# Patient Record
Sex: MALE | Race: ASIAN | NOT HISPANIC OR LATINO | Employment: FULL TIME | URBAN - METROPOLITAN AREA
[De-identification: names, ages, dates, MRNs, and addresses within clinical notes are randomized per-mention and may not be internally consistent; named-entity substitution may affect disease eponyms.]

---

## 2017-03-26 ENCOUNTER — OFFICE VISIT (OUTPATIENT)
Dept: URGENT CARE | Facility: URGENT CARE | Age: 27
End: 2017-03-26
Payer: COMMERCIAL

## 2017-03-26 VITALS
DIASTOLIC BLOOD PRESSURE: 80 MMHG | HEIGHT: 66 IN | TEMPERATURE: 98.4 F | HEART RATE: 82 BPM | WEIGHT: 136 LBS | SYSTOLIC BLOOD PRESSURE: 140 MMHG | OXYGEN SATURATION: 97 % | BODY MASS INDEX: 21.86 KG/M2

## 2017-03-26 DIAGNOSIS — J06.9 VIRAL URI WITH COUGH: Primary | ICD-10-CM

## 2017-03-26 PROCEDURE — 99213 OFFICE O/P EST LOW 20 MIN: CPT | Performed by: FAMILY MEDICINE

## 2017-03-26 RX ORDER — BENZONATATE 200 MG/1
200 CAPSULE ORAL 3 TIMES DAILY PRN
Qty: 30 CAPSULE | Refills: 0 | Status: SHIPPED | OUTPATIENT
Start: 2017-03-26 | End: 2024-03-10

## 2017-03-26 NOTE — PATIENT INSTRUCTIONS

## 2017-03-26 NOTE — MR AVS SNAPSHOT
After Visit Summary   3/26/2017    Kevin Rivas    MRN: [id number]           Patient Information     Date Of Birth          1990        Visit Information        Provider Department      3/26/2017 10:15 AM Jemima Parham MD [facility]        Today's Diagnoses     Viral URI with cough    -  1      Care Instructions      Viral Upper Respiratory Illness (Adult)  You have a viral upper respiratory illness (URI), which is another term for the common cold. This illness is contagious during the first few days. It is spread through the air by coughing and sneezing. It may also be spread by direct contact (touching the sick person and then touching your own eyes, nose, or mouth). Frequent handwashing will decrease risk of spread. Most viral illnesses go away within 7 to 10 days with rest and simple home remedies. Sometimes the illness may last for several weeks. Antibiotics will not kill a virus, and they are generally not prescribed for this condition.    Home care    If symptoms are severe, rest at home for the first 2 to 3 days. When you resume activity, don't let yourself get too tired.    Avoid being exposed to cigarette smoke (yours or others ).    You may use acetaminophen or ibuprofen to control pain and fever, unless another medicine was prescribed. (Note: If you have chronic liver or kidney disease, have ever had a stomach ulcer or gastrointestinal bleeding, or are taking blood-thinning medicines, talk with your healthcare provider before using these medicines.) Aspirin should never be given to anyone under 18 years of age who is ill with a viral infection or fever. It may cause severe liver or brain damage.    Your appetite may be poor, so a light diet is fine. Avoid dehydration by drinking 6 to 8 glasses of fluids per day (water, soft drinks, juices, tea, or soup). Extra fluids will help loosen secretions in the nose and lungs.    Over-the-counter cold  medicines will not shorten the length of time you re sick, but they may be helpful for the following symptoms: cough, sore throat, and nasal and sinus congestion. (Note: Do not use decongestants if you have high blood pressure.)  Follow-up care  Follow up with your healthcare provider, or as advised.  When to seek medical advice  Call your healthcare provider right away if any of these occur:    Cough with lots of colored sputum (mucus)    Severe headache; face, neck, or ear pain    Difficulty swallowing due to throat pain    Fever of 100.4 F (38 C)  Call 911, or get immediate medical care  Call emergency services right away if any of these occur:    Chest pain, shortness of breath, wheezing, or difficulty breathing    Coughing up blood    Inability to swallow due to throat pain    2335-9048 [employer]. [address] [place] [address], [place] [address]. All rights reserved. This information is not intended as a substitute for professional medical care. Always follow your healthcare professional's instructions.              Follow-ups after your visit        Follow-up notes from your care team     Return if symptoms worsen or fail to improve.      Who to contact     If you have questions or need follow up information about today's clinic visit or your schedule please contact [facility] directly at [phone].  Normal or non-critical lab and imaging results will be communicated to you by [employer], letter or phone within 4 business days after the clinic has received the results. If you do not hear from us within 7 days, please contact the clinic through [employer] or phone. If you have a critical or abnormal lab result, we will notify you by phone as soon as possible.  Submit refill requests through [employer] or call your pharmacy and they will forward the refill request to us. Please allow 3 business days for your refill to be completed.          Additional Information About Your Visit       "  MyChart Information     [employer] gives you secure access to your electronic health record. If you see a primary care provider, you can also send messages to your care team and make appointments. If you have questions, please call your primary care clinic.  If you do not have a primary care provider, please call [phone] and they will assist you.        Care EveryWhere ID     This is your Care EveryWhere ID. This could be used by other organizations to access your [facility] medical records  [id number]        Your Vitals Were     Pulse Temperature Height Pulse Oximetry BMI (Body Mass Index)       82 98.4  F (36.9  C) (Tympanic) 5' 6\" (1.676 m) 97% 21.95 kg/m2        Blood Pressure from Last 3 Encounters:   03/26/17 140/80   06/30/16 112/68   04/22/11 132/70    Weight from Last 3 Encounters:   03/26/17 136 lb (61.7 kg)   06/30/16 138 lb 8 oz (62.8 kg)   07/23/15 143 lb 12.8 oz (65.2 kg)              Today, you had the following     No orders found for display         Today's Medication Changes          These changes are accurate as of: 3/26/17 10:34 AM.  If you have any questions, ask your nurse or doctor.               Start taking these medicines.        Dose/Directions    benzonatate 200 MG capsule   Commonly known as:  TESSALON   Used for:  Viral URI with cough   Started by:  Jemima Parham MD        Dose:  200 mg   Take 1 capsule (200 mg) by mouth 3 times daily as needed for cough   Quantity:  30 capsule   Refills:  0            Where to get your medicines      These medications were sent to [id number] - [place], [place] - [address] AT [facility] [address]  [address], [place] [address]     Phone:  [phone]     benzonatate 200 MG capsule                Primary Care Provider    None Specified       No primary provider on file.        Thank you!     Thank you for choosing [facility]  for your care. Our goal is always to provide you with " excellent care. Hearing back from our patients is one way we can continue to improve our services. Please take a few minutes to complete the written survey that you may receive in the mail after your visit with us. Thank you!             Your Updated Medication List - Protect others around you: Learn how to safely use, store and throw away your medicines at www.disposemymeds.org.          This list is accurate as of: 3/26/17 10:34 AM.  Always use your most recent med list.                   Brand Name Dispense Instructions for use    benzonatate 200 MG capsule    TESSALON    30 capsule    Take 1 capsule (200 mg) by mouth 3 times daily as needed for cough       CENTRUM PO      Reported on 3/26/2017       * order for DME     1 Device    Cam boot Left lower extremity.       * order for DME     1 Device    Crutches       TYLENOL PO          VITAMIN D3 PO      Take 1,000 Units by mouth Reported on 3/26/2017       * Notice:  This list has 2 medication(s) that are the same as other medications prescribed for you. Read the directions carefully, and ask your doctor or other care provider to review them with you.

## 2017-03-26 NOTE — PROGRESS NOTES
SUBJECTIVE:   Kevin Rivas is a 27 year old male who complains of nasal congestion, dry cough and tactile fevers for 2-3 days. He denies a history of no other unusual symptoms. He denies a history of asthma. Patient does smoke cigarettes.     OBJECTIVE:  Vitals as noted by Nurse/MA above.  Appearance: in no apparent distress.   ENT- ENT exam normal except mild nasal mucosal irritation, no neck nodes or sinus tenderness.   Chest - chest clear to IPPA, no tachypnea, retractions or cyanosis and S1, S2 normal, no murmur, no gallop, rate regular.    ASSESSMENT:   Cough and Viral upper respiratory illness    PLAN:  Symptomatic therapy suggested: push fluids, rest, use vaporizer or mist needed  and use acetaminophen, cough suppressant of choice as needed. Call or return to clinic prn if these symptoms worsen or fail to improve as anticipated.  Note for work given.   Jemima Gonzales MD

## 2017-03-26 NOTE — LETTER
[facility]   [address] [place] [address]  [id number]  [phone]      March 26, 2017      To Whom It May Concern,    Kevin Rivas was seen today at [facility].  He is not able to attend work tomorrow, March 27, 2017, secondary to a medical illness.  Thanks.     Sincerely,      Jemima Gonzales MD

## 2020-03-01 ENCOUNTER — HEALTH MAINTENANCE LETTER (OUTPATIENT)
Age: 30
End: 2020-03-01

## 2020-12-14 ENCOUNTER — HEALTH MAINTENANCE LETTER (OUTPATIENT)
Age: 30
End: 2020-12-14

## 2021-04-03 ENCOUNTER — OFFICE VISIT (OUTPATIENT)
Dept: URGENT CARE | Facility: URGENT CARE | Age: 31
End: 2021-04-03

## 2021-04-03 VITALS
OXYGEN SATURATION: 100 % | TEMPERATURE: 98.8 F | WEIGHT: 140 LBS | HEART RATE: 86 BPM | HEIGHT: 66 IN | SYSTOLIC BLOOD PRESSURE: 132 MMHG | DIASTOLIC BLOOD PRESSURE: 82 MMHG | BODY MASS INDEX: 22.5 KG/M2

## 2021-04-03 DIAGNOSIS — R22.0 SWELLING OF RIGHT SIDE OF FACE: Primary | ICD-10-CM

## 2021-04-03 LAB — WBC # BLD AUTO: 9.7 10E9/L (ref 4–11)

## 2021-04-03 PROCEDURE — 85048 AUTOMATED LEUKOCYTE COUNT: CPT | Performed by: PHYSICIAN ASSISTANT

## 2021-04-03 PROCEDURE — 99203 OFFICE O/P NEW LOW 30 MIN: CPT | Performed by: PHYSICIAN ASSISTANT

## 2021-04-03 PROCEDURE — 36415 COLL VENOUS BLD VENIPUNCTURE: CPT | Performed by: PHYSICIAN ASSISTANT

## 2021-04-03 RX ORDER — OMEGA-3 FATTY ACIDS/FISH OIL 300-1000MG
200 CAPSULE ORAL EVERY 4 HOURS PRN
COMMUNITY
End: 2024-06-20

## 2021-04-03 ASSESSMENT — MIFFLIN-ST. JEOR: SCORE: 1522.79

## 2021-04-03 NOTE — PATIENT INSTRUCTIONS
Follow up right away with new or worsening symptoms     Follow up if symptoms are not improving in 48 hours    Follow up if symptoms are not resolved at completion of antibiotics    Take probiotic 1 hour after each antibiotic dose    Patient Education     Facial Cellulitis  Cellulitis is an infection of the deep layers of skin. A break in the skin, such as a cut or scratch, can let bacteria under the skin. It may also occur from an infected oil gland (pimple) or hair follicle. If the bacteria get to deep layers of the skin, it can be serious. If not treated, cellulitis can get into the bloodstream and lymph nodes. The infection can then spread throughout the body. This causes serious illness. Cellulitis on the face is especially dangerous if it affects the skin around the eyes.   Cellulitis causes the affected skin to become red, swollen, warm, and sore. The reddened areas have a visible border. You may have a fever, chills, and pain.   Cellulitis is treated with antibiotics taken for 7 to 10 days. Symptoms should get better 1 to 2 days after treatment is started. Make sure to take all the antibiotics for the full number of days until they are gone. Keep taking the medicine even if your symptoms go away.   Home care  Follow these tips:    Take all of the antibiotic medicine exactly as directed until it's gone. Don t miss any doses, especially during the first 7 days. Don t stop taking it when your symptoms get better.    Use a cool compress (face cloth soaked in cool water) on your face to help reduce swelling and pain.    You may use acetaminophen or ibuprofen to reduce pain. Don t use these if you have chronic liver or kidney disease, or ever had a stomach ulcer or gastrointestinal bleeding. Talk with your healthcare provider first.  Follow-up care  Follow up with your healthcare provider, or as advised. If your infection doesn't go away on the first antibiotic, your healthcare provider will prescribe a different  one.   When to seek medical advice  Call your healthcare provider right away if any of these occur:    Fever higher of 100.4  F (38.0  C) or higher after 2 days on antibiotics    Red areas that spread    Swelling or pain that gets worse    Fluid leaking from the skin (pus)    An eyelid that swells shut or leaks fluid (pus)    Headache or neck pain that gets worse    Unusual drowsiness or confusion    Seizure    Change in eyesight  [employer] last reviewed this educational content on 8/1/2019 2000-2020 [employer]. All rights reserved. This information is not intended as a substitute for professional medical care. Always follow your healthcare professional's instructions.

## 2021-04-03 NOTE — PROGRESS NOTES
"SUBJECTIVE:  Kevin Rivas is a 31 year old male who presents to the clinic today for a rash.  Onset of rash was 1 day(s) ago.   Rash is sudden onset.  Location of the rash: face.  Quality/symptoms of rash: painful, red and swollen   Symptoms are moderate and rash seems to be worsening.  Previous history of a similar rash? No  Recent exposure history: none known    Associated symptoms include: nothing.    Past Medical History:   Diagnosis Date     Fracture of elbow      Hepatitis     ? in [facility]     Current Outpatient Medications   Medication Sig Dispense Refill     amoxicillin-clavulanate (AUGMENTIN) 875-125 MG tablet Take 1 tablet by mouth 2 times daily for 7 days 14 tablet 0     Cholecalciferol (VITAMIN D3 PO) Take 1,000 Units by mouth Reported on 3/26/2017       ibuprofen (ADVIL/MOTRIN) 200 MG capsule Take 200 mg by mouth every 4 hours as needed for fever       Acetaminophen (TYLENOL PO)        benzonatate (TESSALON) 200 MG capsule Take 1 capsule (200 mg) by mouth 3 times daily as needed for cough (Patient not taking: Reported on 4/3/2021) 30 capsule 0     Multiple Vitamins-Minerals (CENTRUM PO) Reported on 3/26/2017       order for DME Cam boot Left lower extremity. (Patient not taking: Reported on 3/26/2017) 1 Device 0     order for DME Crutches (Patient not taking: Reported on 3/26/2017) 1 Device 0     Social History     Tobacco Use     Smoking status: Current Every Day Smoker     Smokeless tobacco: Never Used   Substance Use Topics     Alcohol use: No     Alcohol/week: 0.0 standard drinks       ROS:  10 point ROS negative except as listed above      EXAM:   /82   Pulse 86   Temp 98.8  F (37.1  C) (Oral)   Ht 1.676 m (5' 6\")   Wt 63.5 kg (140 lb)   SpO2 100%   BMI 22.60 kg/m    SKIN: Rash description:    Distribution: right upper lip with swelling warmth and tenderness  GENERAL APPEARANCE: healthy, alert and no distress  EYES:  conjunctiva clear  NECK: supple, non-tender to palpation, no " adenopathy noted  RESP: lungs clear to auscultation - no rales, rhonchi or wheezes  CV: regular rates and rhythm, normal S1 S2, no murmur noted      Results for orders placed or performed in visit on 04/03/21   WBC count     Status: None   Result Value Ref Range    WBC 9.7 4.0 - 11.0 10e9/L       ASSESSMENT:  (R22.0) Swelling of right side of face  (primary encounter diagnosis)  Plan: WBC count, amoxicillin-clavulanate (AUGMENTIN)         875-125 MG tablet      Patient Instructions   Follow up right away with new or worsening symptoms     Follow up if symptoms are not improving in 48 hours    Follow up if symptoms are not resolved at completion of antibiotics    Take probiotic 1 hour after each antibiotic dose    Patient Education     Facial Cellulitis  Cellulitis is an infection of the deep layers of skin. A break in the skin, such as a cut or scratch, can let bacteria under the skin. It may also occur from an infected oil gland (pimple) or hair follicle. If the bacteria get to deep layers of the skin, it can be serious. If not treated, cellulitis can get into the bloodstream and lymph nodes. The infection can then spread throughout the body. This causes serious illness. Cellulitis on the face is especially dangerous if it affects the skin around the eyes.   Cellulitis causes the affected skin to become red, swollen, warm, and sore. The reddened areas have a visible border. You may have a fever, chills, and pain.   Cellulitis is treated with antibiotics taken for 7 to 10 days. Symptoms should get better 1 to 2 days after treatment is started. Make sure to take all the antibiotics for the full number of days until they are gone. Keep taking the medicine even if your symptoms go away.   Home care  Follow these tips:    Take all of the antibiotic medicine exactly as directed until it's gone. Don t miss any doses, especially during the first 7 days. Don t stop taking it when your symptoms get better.    Use a cool  compress (face cloth soaked in cool water) on your face to help reduce swelling and pain.    You may use acetaminophen or ibuprofen to reduce pain. Don t use these if you have chronic liver or kidney disease, or ever had a stomach ulcer or gastrointestinal bleeding. Talk with your healthcare provider first.  Follow-up care  Follow up with your healthcare provider, or as advised. If your infection doesn't go away on the first antibiotic, your healthcare provider will prescribe a different one.   When to seek medical advice  Call your healthcare provider right away if any of these occur:    Fever higher of 100.4  F (38.0  C) or higher after 2 days on antibiotics    Red areas that spread    Swelling or pain that gets worse    Fluid leaking from the skin (pus)    An eyelid that swells shut or leaks fluid (pus)    Headache or neck pain that gets worse    Unusual drowsiness or confusion    Seizure    Change in eyesight  [employer] last reviewed this educational content on 8/1/2019 2000-2020 [employer]. All rights reserved. This information is not intended as a substitute for professional medical care. Always follow your healthcare professional's instructions.

## 2021-04-03 NOTE — LETTER
[facility] [place] [address] [place] [address]  Phone: [phone]    April 3, 2021        Kevin Rivas  [address]  [facility] [address]          To whom it may concern:    RE: Kevin Rivas    Patient was seen and treated today at our clinic.  Please excuse absence on 4/5/21.    Please contact me for questions or concerns.      Sincerely,        Kalyan Spear PA-C

## 2021-04-17 ENCOUNTER — HEALTH MAINTENANCE LETTER (OUTPATIENT)
Age: 31
End: 2021-04-17

## 2021-10-02 ENCOUNTER — HEALTH MAINTENANCE LETTER (OUTPATIENT)
Age: 31
End: 2021-10-02

## 2022-05-14 ENCOUNTER — HEALTH MAINTENANCE LETTER (OUTPATIENT)
Age: 32
End: 2022-05-14

## 2022-07-14 ENCOUNTER — HOSPITAL ENCOUNTER (EMERGENCY)
Facility: CLINIC | Age: 32
Discharge: HOME | End: 2022-07-15
Attending: EMERGENCY MEDICINE | Admitting: EMERGENCY MEDICINE

## 2022-07-14 DIAGNOSIS — F19.10 SUBSTANCE ABUSE (H): ICD-10-CM

## 2022-07-14 LAB
ANION GAP SERPL CALCULATED.3IONS-SCNC: 5 MMOL/L (ref 3–14)
BASOPHILS # BLD AUTO: 0.1 10E3/UL (ref 0–0.2)
BASOPHILS NFR BLD AUTO: 1 %
BUN SERPL-MCNC: 18 MG/DL (ref 7–30)
CALCIUM SERPL-MCNC: 8.7 MG/DL (ref 8.5–10.1)
CHLORIDE BLD-SCNC: 107 MMOL/L (ref 94–109)
CO2 SERPL-SCNC: 24 MMOL/L (ref 20–32)
CREAT SERPL-MCNC: 0.88 MG/DL (ref 0.66–1.25)
EOSINOPHIL # BLD AUTO: 0 10E3/UL (ref 0–0.7)
EOSINOPHIL NFR BLD AUTO: 0 %
ERYTHROCYTE [DISTWIDTH] IN BLOOD BY AUTOMATED COUNT: 12.3 % (ref 10–15)
GFR SERPL CREATININE-BSD FRML MDRD: >90 ML/MIN/1.73M2
GLUCOSE BLD-MCNC: 101 MG/DL (ref 70–99)
HCT VFR BLD AUTO: 39.5 % (ref 40–53)
HGB BLD-MCNC: 13.8 G/DL (ref 13.3–17.7)
IMM GRANULOCYTES # BLD: 0 10E3/UL
IMM GRANULOCYTES NFR BLD: 0 %
LYMPHOCYTES # BLD AUTO: 1.2 10E3/UL (ref 0.8–5.3)
LYMPHOCYTES NFR BLD AUTO: 13 %
MCH RBC QN AUTO: 28.4 PG (ref 26.5–33)
MCHC RBC AUTO-ENTMCNC: 34.9 G/DL (ref 31.5–36.5)
MCV RBC AUTO: 81 FL (ref 78–100)
MONOCYTES # BLD AUTO: 0.5 10E3/UL (ref 0–1.3)
MONOCYTES NFR BLD AUTO: 6 %
NEUTROPHILS # BLD AUTO: 7.3 10E3/UL (ref 1.6–8.3)
NEUTROPHILS NFR BLD AUTO: 80 %
NRBC # BLD AUTO: 0 10E3/UL
NRBC BLD AUTO-RTO: 0 /100
PLATELET # BLD AUTO: 319 10E3/UL (ref 150–450)
POTASSIUM BLD-SCNC: 3.1 MMOL/L (ref 3.4–5.3)
RBC # BLD AUTO: 4.86 10E6/UL (ref 4.4–5.9)
SARS-COV-2 RNA RESP QL NAA+PROBE: NEGATIVE
SODIUM SERPL-SCNC: 136 MMOL/L (ref 133–144)
WBC # BLD AUTO: 9.1 10E3/UL (ref 4–11)

## 2022-07-14 PROCEDURE — 85025 COMPLETE CBC W/AUTO DIFF WBC: CPT | Performed by: EMERGENCY MEDICINE

## 2022-07-14 PROCEDURE — 258N000003 HC RX IP 258 OP 636: Performed by: EMERGENCY MEDICINE

## 2022-07-14 PROCEDURE — 250N000013 HC RX MED GY IP 250 OP 250 PS 637: Performed by: EMERGENCY MEDICINE

## 2022-07-14 PROCEDURE — 99285 EMERGENCY DEPT VISIT HI MDM: CPT | Mod: CS,25

## 2022-07-14 PROCEDURE — C9803 HOPD COVID-19 SPEC COLLECT: HCPCS

## 2022-07-14 PROCEDURE — U0005 INFEC AGEN DETEC AMPLI PROBE: HCPCS | Performed by: EMERGENCY MEDICINE

## 2022-07-14 PROCEDURE — 96361 HYDRATE IV INFUSION ADD-ON: CPT

## 2022-07-14 PROCEDURE — 80048 BASIC METABOLIC PNL TOTAL CA: CPT | Performed by: EMERGENCY MEDICINE

## 2022-07-14 PROCEDURE — 96360 HYDRATION IV INFUSION INIT: CPT

## 2022-07-14 PROCEDURE — 90791 PSYCH DIAGNOSTIC EVALUATION: CPT

## 2022-07-14 PROCEDURE — 99283 EMERGENCY DEPT VISIT LOW MDM: CPT | Mod: CS

## 2022-07-14 PROCEDURE — 36415 COLL VENOUS BLD VENIPUNCTURE: CPT | Performed by: EMERGENCY MEDICINE

## 2022-07-14 RX ORDER — POTASSIUM CHLORIDE 1.5 G/1.58G
40 POWDER, FOR SOLUTION ORAL ONCE
Status: COMPLETED | OUTPATIENT
Start: 2022-07-14 | End: 2022-07-14

## 2022-07-14 RX ADMIN — POTASSIUM CHLORIDE 40 MEQ: 1.5 POWDER, FOR SOLUTION ORAL at 23:35

## 2022-07-14 RX ADMIN — SODIUM CHLORIDE 1000 ML: 9 INJECTION, SOLUTION INTRAVENOUS at 12:27

## 2022-07-14 NOTE — ED NOTES
Bed: ED16  Expected date:   Expected time:   Means of arrival:   Comments:  [facility] - 515 - 32 M psych eval eta 1126

## 2022-07-14 NOTE — DISCHARGE INSTRUCTIONS
"Aftercare Plan  If I am feeling unsafe or I am in a crisis, I will:   Contact my established care providers   Call the National Suicide Prevention Lifeline: 152.674.5304   Go to the nearest emergency room   Call 911     Warning signs that I or other people might notice when a crisis is developing for me: \"I start to get nervious\"    Things I am able to do on my own to cope or help me feel better: Watch TV    Things that I am able to do with others to cope or help me better: Call my sister    Things I can use or do for distraction: watch movies  Changes I can make to support my mental health and wellness:  Go treatment    People in my life that I can ask for help: my sister    Your [facility] has a mental health crisis team you can call 24/7: [facility]  [phone]    Other things that are important when I'm in crisis: Patient denied knowing any other important things.    Additional resources and information: Patient is in need of calling [facility] to schedule an appointment for a psychiatrist at [phone] and a Substance Use Disorder assessment at [phone].      Crisis Lines  Crisis Text Line  Text 723335  You will be connected with a trained live crisis counselor to provide support.    Por espanol, texto  CHIQUIS a 249165 o texto a 442-AYUDAME en WhatsApp    [employer] (LGBTQ Youth Crisis Line)  5.642.699.9144  text START to 859-410      Community Resources  Fast Tracker  Linking people to mental health and substance use disorder resources  fasttrackermn.org     [place] Mental Health Warm Line  Peer to peer support  Monday thru Saturday, 12 pm to 10 pm  176.869.0235 or 1.475.431.6473  Text \"Support\" to 19005    [employer] [place] on Mental Illness (KEYANNA)  801.404.3746 or 1.888.KEYANNA.HELPS      Mental Health Apps  My3  https://my3app.org/    VirtualHopeBox  https://[employer].org/apps/virtual-hope-box/    [facility] will follow up with patient to schedule patient " for a therapy appointment and to sign patient up for insurance.

## 2022-07-14 NOTE — ED PROVIDER NOTES
History   Chief Complaint:  Drug Problem and Paranoid       The history is provided by a relative.      Kevin Rivas is a 32 year old male with history of methamphetamine abuse who presents with paranoia. The patient has been on a three day methamphetamine jackman. Today the patient began calling 911 multiple times as he was paranoid that he had hurt his girlfriend while he was using. When PD arrived, they confirmed this was not true. The patient was quite agitated and EMS administered 5 mg IM versed and 5 mg IM Haldol. While in the ED, the patient is heavily sedated and thus HPI and ROS are limited. Per the patient's sister, he had a similar episode of methamphetamine induced paranoia two years ago but he was not seen by a provider. She also notes that for the past few months, he has been having ongoing issues with paranoia. He does not like to go to the doctor's often and has no physical or mental health diagnosis. The patient recently lost his job in May and has been using methamphetamines increasingly often. He lives with a friend. She does not believe the patient has had any COVID symptoms recently including fever, sore throat, or cough.       Review of Systems   Unable to perform ROS: Acuity of condition       Allergies:  The patient has no known allergies.     Medications:  The patient is currently on no regular medications.    Past Medical History:     The patient has no pertinent medical history.    Past Surgical History:    The patient has no pertinent surgical history.     Family History:    Mother: hypertension    Social History:  The patient presents to the ED with his sister via EMS  Living Situation: Lives in [place] with a friend  Drug Use: Methamphetamine and alcohol  Occupation: Currently unemployed.       Physical Exam     Patient Vitals for the past 24 hrs:   BP Temp Temp src Pulse Resp SpO2   07/14/22 1515 108/77 -- -- 68 14 100 %   07/14/22 1500 112/76 -- -- 72 (!) 35 100 %   07/14/22 1450  103/74 -- -- 70 12 100 %   07/14/22 1430 110/67 -- -- 93 22 100 %   07/14/22 1420 107/71 -- -- 62 13 100 %   07/14/22 1400 91/57 -- -- 92 26 100 %   07/14/22 1215 92/62 -- -- 84 16 97 %   07/14/22 1145 93/65 97.4  F (36.3  C) Temporal 87 16 96 %       Physical Exam    Physical Exam   Constitutional:  Patient is extremely sedated.   HENT:   Mouth/Throat:   Oropharynx is clear and moist.   Eyes:    Conjunctivae normal and EOM are normal. Pupils are 2 mm, equal, round, and reactive to light.   Neck:    Normal range of motion.   Cardiovascular: Normal rate, regular rhythm and normal heart sounds.  Exam reveals no gallop and no friction rub.  No murmur heard.  Pulmonary/Chest:  Effort normal and breath sounds normal. Patient has no wheezes. Patient has no rales.   Abdominal:   Soft. Bowel sounds are normal. Patient exhibits no mass. There is no tenderness. There is no rebound and no guarding.   Musculoskeletal:  Normal range of motion. Patient exhibits no edema.   Neurological:   Patient is extremely sedated.  Arouses to sternal rub.  Appears to move all extremities when I do this.  Skin:   Skin is warm and dry. No rash noted. No erythema.   Psychiatric:   Patient is heavily sedated.  Unable to assess at this time.  Patient is rousable by sternal rub.        Emergency Department Course     Laboratory:  Labs Ordered and Resulted from Time of ED Arrival to Time of ED Departure   BASIC METABOLIC PANEL - Abnormal       Result Value    Sodium 136      Potassium 3.1 (*)     Chloride 107      Carbon Dioxide (CO2) 24      Anion Gap 5      Urea Nitrogen 18      Creatinine 0.88      Calcium 8.7      Glucose 101 (*)     GFR Estimate >90     CBC WITH PLATELETS AND DIFFERENTIAL - Abnormal    WBC Count 9.1      RBC Count 4.86      Hemoglobin 13.8      Hematocrit 39.5 (*)     MCV 81      MCH 28.4      MCHC 34.9      RDW 12.3      Platelet Count 319      % Neutrophils 80      % Lymphocytes 13      % Monocytes 6      % Eosinophils 0       % Basophils 1      % Immature Granulocytes 0      NRBCs per 100 WBC 0      Absolute Neutrophils 7.3      Absolute Lymphocytes 1.2      Absolute Monocytes 0.5      Absolute Eosinophils 0.0      Absolute Basophils 0.1      Absolute Immature Granulocytes 0.0      Absolute NRBCs 0.0     COVID-19 VIRUS (CORONAVIRUS) BY PCR - Normal    SARS CoV2 PCR Negative              Emergency Department Course:             Reviewed:  I reviewed nursing notes, vitals and past medical history    Assessments:  1200 I obtained history and examined the patient as noted above.   1425 I rechecked the patient.    Interventions:  1227 NS 1L IV Bolus    Disposition:  Care of the patient was transferred to my colleague Dr. Odonnell pending DEC assessment.     Impression & Plan     CMS Diagnoses: None    Medical Decision Making:  Kevin Rivas is a 32-year-old gentleman presenting to the emergency department on a health officer hold after he was extremely agitated after calling the police reading that he could have harmed his girlfriend while on a 3-day meth jackman.  On my assessment the patient was extremely sedated due to medications administered by EMS.  His sister is at the bedside.  She does state that he has a longstanding history of symptoms of psychosis.  He is never sought any help for this.  He has been abusing meth for some time as well.  Its unclear whether this is drug-induced psychosis or whether this is underlying psychosis that he is treating with meth.  Metabolic panel and COVID were obtained.  Urine drug screen is written for but it has not been obtained yet.  The patient is so sedated at this time we will have to allow him to awaken up from his medication for proper assessment.  I will sign him out to my partner.  In speaking with his sister it sounds like he has not desired help and refused to get help in the past.  She denies any attempts at self-harm or verbalization of self-harm.  He does live with a roommate.  Family is at  bedside at this point.  Will sign out pending ability to do a more thorough assessment.    Diagnosis:    ICD-10-CM    1. Substance abuse (H)  F19.10          Scribe Disclosure:  I, Dorothea Lopez, am serving as a scribe at 11:53 AM on 7/14/2022 to document services personally performed by Harper Shields MD based on my observations and the provider's statements to me.              Harper Shields MD  07/14/22 4072

## 2022-07-14 NOTE — ED TRIAGE NOTES
Pt present via EMS, per EMS pt has been on a 3 day meth jackman and today called 911 multiple times with paranoid statements believing her hurt his GF while he was high.  (PD confirmed this is not true).  Pt initially non complaint with EMS and PD and given IM versed 5 mg and IM haldol 5 mg.  Family reports pt has long Hx of paranoia.    Pt presents resting breathingi                                                                                                                                                                                                                                                                                                                                                                                                                                                                                                                                                                                                                                                                                                                                                                                                                                                                                                                                                                                                                                                                                                                                                                                                                                                                                                                                              even easy and un labored VSS

## 2022-07-14 NOTE — ED PROVIDER NOTES
Sign Out Note     Pt accepted in sign out from:  Dr. Shields    Briefly pt presented to the ED for:  Patient presented to the ED after having used methamphetamines for about 3 days in a row.  He was extremely agitated, and was sedated with Haldol and Versed in route.  He was very sedated upon arrival to the ED.  He has been clearing appropriately.  COVID test was negative.     Plan at time of sign out: DEC assessment when more awake.     Care of patient during my shift: No issues, patient was cooperative and sleeping.  10:56 PM  Face-to-face reassessment, the patient is alert, oriented, clinically sober.  Denies any suicidal ideation.     Plan for patient at this time: Discharged home with outpatient resources, therapy appointment per DEC .     MD Belle Hendrickson, Leeann Estevez MD  07/14/22 9966

## 2022-07-15 ENCOUNTER — TELEPHONE (OUTPATIENT)
Dept: BEHAVIORAL HEALTH | Facility: CLINIC | Age: 32
End: 2022-07-15

## 2022-07-15 VITALS
DIASTOLIC BLOOD PRESSURE: 70 MMHG | RESPIRATION RATE: 16 BRPM | TEMPERATURE: 97.4 F | SYSTOLIC BLOOD PRESSURE: 104 MMHG | OXYGEN SATURATION: 99 % | HEART RATE: 61 BPM

## 2022-07-15 NOTE — CONSULTS
"7/14/2022  Kevin Rivas 1990     Diagnostic Evaluation Consultation  Crisis Assessment    Patient was assessed: in person  Patient location: [facility] ED  Was a release of information signed: No. Reason: Patient refused to sign.      Referral Data and Chief Complaint  Kevin is a 32  year old, who uses he/him pronouns, and presents to the ED with family/friends. Patient is referred to the ED by self. Patient is presenting to the ED for the following concerns: paranoia.      Informed Consent and Assessment Methods     Patient is his own guardian. Writer met with patient and explained the crisis assessment process, including applicable information disclosures and limits to confidentiality, assessed understanding of the process, and obtained consent to proceed with the assessment. Patient was observed to be able to participate in the assessment as evidenced by patient being able to answer question asked by this writer.. Assessment methods included conducting a formal interview with patient, review of medical records, collaboration with medical staff, and obtaining relevant collateral information from family and community providers when available..     Over the course of this crisis assessment provided reassurance, offered validation and motivational interviewing.. Patient's response to interventions was Patient was able open about why he was brought to the ED.     Summary of Patient Situation     Patient is a 32 year old who uses he/him pronouns that presents to the ED via sister.  Per chart review patient has recently on a \"three day Meth jackman\".  Patient denies knowing how often he has used or how much.   Patient does admit that three years ago he had a suicide plan to drink himself to death. Due to his Meth use patient is willing to get a BRIGITTE assessment in order to go to BRIGITTE treatment.    Patient denies hallucinations, rodo, paranoia and delusions.  Per collateral patient has a history of " "paranoia.  Collateral reports that over the past few months patient has been acting paranoid thinking that people were watching him, hacking his devices and thinking that people were out to kill him.  Collateral also reports that patient is paranoid and thinking that his friends were setting him up to get killed.    Patient endorses current and history of depression and anxiety.  Patient reports that his current depression is at 8-9 on a scale from 1-10.  Patient reports this depression is due to his long distance relationship with her girlfriend of 7 years and has been going on for the past 7 months.  Patient reports that his current anxiety is at an 8 on a scale from 1-10.  Patient reports that it is due to life stress and has been occurring for the past month.      Patient reports that his coping skills are watching TV and playing video games.  Patient reports that for distraction he watching movies and that his hobby is playing basketball.    Brief Psychosocial History     Patient reports to be currently living  With a friend in [place].  Patient denies having any family history of MH/BRIGITTE disorders.  Patient reports to be currently unemployed since May and denies any source of income or financial stress.  Patient denies being a  or having any cultural, Restorationism or spiritual influences on his MH care.   Patient reports that his hobby is playing basketball.  Patient reports that his support system is his sister.  Patient was convicted of driving without a valid  license on 04/07/2022.     Significant Clinical History     Patient reports current depression for the past 7 months and current anxiety for the past month.  Per chart review, patient has been on a \"threeday Meth jackman\".  Patient denies knowing how long her has used Meth or how much he has used currently or historically.  Patient also has a history of alcohol use as he reports he had a suicide plan three years ago of wanting to die by " drinking himself to death.  Patient denies any MH/BRIGITTE diagnoses.  Patient denies knowing how his PCP and denies having a therapist or psychiatrist.  Patient reports that he would like referrals to a therapist and psychiatrist. Patient denies any MH/BRIGITTE treatemtn episodes of any type.  Patient has no history of commitment,Doan or Garnett Mejia Orders.  Patient denies any current or history of trauma or abuse.       Collateral Information    The following information was received from [place] whose relationship to the patient is sister. Information was obtained in person. Their phone number is [phone] and they last had contact with patient on 07/14/2022.     What happened today: Collateral reports that patient has been dealing with paranoia for the last few months.  Collateral reports that patient has been reporting that people have been watching him and hacking his devices and that someone is going to kill him.       What is different about patient's functioning: Collateral reports that patient does not usually talk about being paranoid.  Collateral reports that patient only trusts his sister and mother.  Collateral reports that patient thinks that his friends are setting him up to get killed.  Collateral also reports that patient reports that he hears voices while on cam having cyber sex with his long distance girlfriend.     Concern about alcohol/drug use: Yes Collateral reports that she is concerned about patient's Meth use and knows patient uses alcohol, but unsure as to what level patient is using alcohol.     What do you think the patient needs: Collateral reports that patient is in need of food, a vehicle and 's license, a psychiatric services, a therapist and medical attention for a physical.     Has patient made comments about wanting to kill themselves/others:  Yes Collateral reports that patient has been comments about wanting to die, but nothing specific.     If d/c is recommended, can they  "take part in safety/aftercare planning: Yes Collateral reports to be willing to keep patient safe and help him follow reccommendations of the final disposition by the [facility] and psychiatric provider.     Other information: N/A       Risk Assessment  ESS-6  1.a. Over the past 2 weeks, have you had thoughts of killing yourself? Patient denies  1.b. Have you ever attempted to kill yourself and, if yes, when did this last happen? Patient denies   2. Recent or current suicide plan? No, but patient reports to have thoughts drinking himself to death three years ago.  3. Recent or current intent to act on ideation? Patient denies  4. Lifetime psychiatric hospitalization? No  5. Pattern of excessive substance use? Patient recently went a \"three day Meth jackman\".  6. Current irritability, agitation, or aggression? No  Scoring note: BOTH 1a and 1b must be yes for it to score 1 point, if both are not yes it is zero. All others are 1 point per number. If all questions 1a/1b - 6 are no, risk is negligible. If one of 1a/1b is yes, then risk is mild. If either question 2 or 3, but not both, is yes, then risk is automatically moderate regardless of total score. If both 2 and 3 are yes, risk is automatically high regardless of total score.      Score: 1, mild risk      Does the patient have access to lethal means? Patient denies, however per collateral when patient's room was searched by her she found a knife in his room.     Does the patient engage in non-suicidal self-injurious behavior (NSSI/SIB)? Patient denies, however per collateral whenshe searched patient's room, she found a knife in his room.     Does the patient have thoughts of harming others? No     Is the patient engaging in sexually inappropriate behavior?  no        Current Substance Abuse     Is there recent substance abuse? Patient reports to have recently used Meth, but does not know how much or for how long.  Per chart review, \"patient has been on a three day Meth " "jackman\".     Was a urine drug screen or blood alcohol level obtained: No       Mental Status Exam     Affect: Constricted   Appearance: Appropriate    Attention Span/Concentration: Inattentive  Eye Contact: Variable   Fund of Knowledge: Delayed    Language /Speech Content: Fluent   Language /Speech Volume: Soft    Language /Speech Rate/Productions: Minimally Responsive    Recent Memory: Poor   Remote Memory: Poor   Mood: Depressed    Orientation to Person: No    Orientation to Place: No   Orientation to Time of Day: Yes    Orientation to Date: Yes    Situation (Do they understand why they are here?): Yes    Psychomotor Behavior: Normal    Thought Content: Other: Patient had a haed time processing questions asked as he was very tired duirng assessement.   Thought Form: Loose Associations      History of commitment: No        Medication    Psychotropic medications: Patient is not prescribed any psychotropic medications.   Medication changes made in the last two weeks: Patient is not prescribed any psychotropic medicayions    Current Care Team    Primary Care Provider: Patient denies knowing who this provider is, but reports that he has seen this provider within the past year.  Psychiatrist: Patient denies having this type of provider, but wants a referral for a psychiatrist.  Therapist:  Patient denies having this type of provider, but wants a referral for a therapist.  : Patient denies having this type of provider.  CTSS or ARMHS: Patient denies having this type of provider.  ACT Team: Patient denies having this type of provider.  Other: Patient denies having this type of provider.      Diagnosis    311 (F32.9) Unspecified Depressive Disorder      Clinical Summary and Substantiation of Recommendations     Patient is not a danger of self or others as evidenced by patient denying current SI or current or history of SIB or HI..  Patient reports that he had a plan to kill himself by drinking himself to death " "three years ago.  Patient has a history of Meth and alcohol abuse and per chart  chart review, patient has recently been on a \"three day Meth jackman\".  Patient reports current depression at a 8-9 on a scale of 1-10 and a history of depression for the past 7-8 months due to his relationship with his long distance girlfriend that he has had for the past 7 years.  Patient reports to have anxiety at an 8 on a scale from 1-10.  Patient denies delusions, hallucinations, paranoia and rodo.  Per collateral report patient has a history of paranoia Patient reports that this anxiety is caused by life stress and it has been occurring for the past month.  Patient reports to have a support system, hobbies, coping skills as well as stable housing. Patient denies being prescribed any psychotropic medications and is willing to get referrals for a therapist, psychiatrist and and a BRIGITTE assessment.     Disposition    Recommended disposition: It is recommended that this patient be sent home with a safety plan in place and a a referral to therapy, psychiatry and a BRIGITTE assessment.     Reviewed case and recommendations with attending provider. Attending Name: Leeann Odonnell MD     Attending concurs with disposition: Yes       Patient concurs with disposition: Yes       Guardian concurs with disposition: NA      Final disposition: Patient will be discharged to home with a safety plan in place and referrals to therapy, psychiatry, and a BRIGITTE assessment.      Was lethal means counseling provided as a part of aftercare planning? Patient denied having access to firearms or knives, but patient was spoken to about safety with knives as collateral reports that she found a knife in patient's room at his apartment.         Assessment Details    Patient interview started at: 2215 and completed at: 2245.     Total duration spent on the patient case in minutes: 1.0 hrs      CPT code(s) utilized: 52324 - Psychotherapy for Crisis - 60 (30-74*) min " "      Percy Pollack, MSPATRICK, Psychotherapist Trainee  DEC - Triage & Transition Services          Aftercare Plan  If I am feeling unsafe or I am in a crisis, I will:   Contact my established care providers   Call the National Suicide Prevention Lifeline: 748.910.7303   Go to the nearest emergency room   Call 911     Warning signs that I or other people might notice when a crisis is developing for me: \"I start to get nervious\"    Things I am able to do on my own to cope or help me feel better: Watch TV, play video games    Things that I am able to do with others to cope or help me better: Call my sister    Things I can use or do for distraction: watch movies  Changes I can make to support my mental health and wellness:  Go treatment    People in my life that I can ask for help: my sister    Your [facility] has a mental health crisis team you can call 24/7: [facility]  [phone]    Other things that are important when I'm in crisis: Patient denied knowing any other important things.    Additional resources and information: Patient is in need of calling [facility] to schedule an appointment for a psychiatrist at [phone] and a Substance Use Disorder assessment at [phone].      Crisis Lines  Crisis Text Line  Text 249163  You will be connected with a trained live crisis counselor to provide support.    Por espanol, texto  CHIQUIS a 633286 o texto a 442-AYUDAME en WhatsApp    [employer] (LGBTQ Youth Crisis Line)  2.480.436.4196  text START to 178-860      Community Resources  Fast Tracker  Linking people to mental health and substance use disorder resources  fasttrackermn.org     [place] Mental Health Warm Line  Peer to peer support  Monday thru Saturday, 12 pm to 10 pm  534.441.7270 or 7.732.164.2992  Text \"Support\" to 56941    National [employer] Mental [employer]ness (KEYANNA)  364.764.9930 or 1.888.KEYANNA.HELPS      Mental Health Apps  My3  https://myEye Phonepp.org/    VirtualHopeBox  " https://[employer]/apps/virtual-hope-box/      Additional Information  Today you were seen by a licensed mental health professional through Triage and Transition services, [facility] (P)  for a crisis assessment in the Emergency Department at [facility].  It is recommended that you follow up with your established providers (psychiatrist, mental health therapist, and/or primary care doctor - as relevant) as soon as possible. Coordinators from [facility] will be calling you in the next 24-48 hours to ensure that you have the resources you need.  You can also contact [facility] coordinators directly at [phone]. You may have been scheduled for or offered an appointment with a mental health provider. [facility] maintains an extensive network of licensed behavioral health providers to connect patients with the services they need.  We do not charge providers a fee to participate in our referral network.  We match patients with providers based on a patient's specific needs, insurance coverage, and location.  Our first effort will be to refer you to a provider within your care system, and will utilize providers outside your care system as needed.

## 2022-07-15 NOTE — TELEPHONE ENCOUNTER
First attempt to contact pt.  left a VM with TC contact info and encouraged a phone call back to schedule initial therapy appointment. Abdirahmanr has sent referral to [facility] as well for insurance assistance. Abdirahmanr will postpone for tomorrow.    Milena Billyra  07/15/22  908    ----- Message from GEOFF Castro sent at 7/14/2022 11:23 PM CDT -----  Type of Referral:     ___X__Therapy   _____Therapy & Medication (Therapy will be scheduled first)   _____Medication Only     Referring Provider Name: GEOFF Lawson, [facility] [place]  Referring Provider Contact Phone Number: [phone]     Reason for Transition Clinic Referral: Patient discharged from the ED. Patient is need of a therapy appointment and to be signed up for insurance as he currently does not have insurance.    Next Level of Care Patient Will Be Transitioned To: Home.     Start Date for Next Level of Care (Required): 07/14;2022    Type of Clinical Assessment Completed (Crisis, DA, BRIGITTE, etc.): Crisis Assessment     Date Clinical Assessment was Completed: 07/14/2022    Diagnosis:   F32.9 Unspecified Depressive Disorder    What Would Be Helpful from the Transition Clinic: Individual therapy.      Needs: NO     Does Patient Have Access to Technology: Yes.     Patient E-mail Address: [email or web address]    Current Patient Phone Number: [phone]    Clinician Gender Preference (if applicable): N/A

## 2022-07-15 NOTE — ED NOTES
This writer went over to ED 16 to assess this patient.  The  nurses reported that this patient was too sedated to be assessed.  However patient's sister was in room to get collateral information on patient.    The following information was received from Liliana whose relationship to the patient is sister. Information was obtained in person. Their phone number is [phone] and they last had contact with patient on 07/14/2022.    What happened today: Collateral reports that patient has been dealing with paranoia for the last few months.  Collateral reports that patient has been reporting that people have been watching him and hacking his devices and that someone is going to kill him.      What is different about patient's functioning: Collateral reports that patient does not usually talk about being paranoid.  Collateral reports that patient only trusts his sister and mother.  Collateral reports that patient thinks that his friends are setting him up to get killed.  Collateral also reports that patient reports that he hears voices while on cam having cyber sex with his long distance girlfriend.    Concern about alcohol/drug use: Yes Collateral reports that she is concerned about patient's Meth use and knows patient uses alcohol, but unsure as to what level patient is using alcohol.    What do you think the patient needs: Collateral reports that patient is in need of food, a vehicle and 's license, a psychiatric services, a therapist and medical attention for a physical.    Has patient made comments about wanting to kill themselves/others:  Yes Collateral reports that patient has been comments about wanting to die, but nothing specific.    If d/c is recommended, can they take part in safety/aftercare planning: Yes Collateral reports to be willing to keep patient safe and help him follow reccommendations of the final disposition by the [facility] and psychiatric provider.    Other information: N/A

## 2022-07-16 ENCOUNTER — TELEPHONE (OUTPATIENT)
Dept: BEHAVIORAL HEALTH | Facility: CLINIC | Age: 32
End: 2022-07-16

## 2022-07-16 NOTE — TELEPHONE ENCOUNTER
2nd attempt. Writer left message re: scheduling therapy with tc. Coordinator will jazzy referral as complete and will inform referral source that no appointments were scheduled wit pt due to no contact.       Erma Loyola  Care Coordinator  7.16  ----- Message from GEOFF Castro sent at 7/14/2022 11:23 PM CDT -----  Type of Referral:     ___X__Therapy   _____Therapy & Medication (Therapy will be scheduled first)   _____Medication Only     Referring Provider Name: GEOFF Lawson, [facility][note 3]  Referring Provider Contact Phone Number: [phone]     Reason for Transition Clinic Referral: Patient discharged from the ED. Patient is need of a therapy appointment and to be signed up for insurance as he currently does not have insurance.    Next Level of Care Patient Will Be Transitioned To: Home.     Start Date for Next Level of Care (Required): 07/14;2022    Type of Clinical Assessment Completed (Crisis, DA, BRIGITTE, etc.): Crisis Assessment     Date Clinical Assessment was Completed: 07/14/2022    Diagnosis:   F32.9 Unspecified Depressive Disorder    What Would Be Helpful from the Transition Clinic: Individual therapy.      Needs: NO     Does Patient Have Access to Technology: Yes.     Patient E-mail Address: [email or web address]    Current Patient Phone Number: [phone]    Clinician Gender Preference (if applicable): N/A

## 2022-07-18 ENCOUNTER — PATIENT OUTREACH (OUTPATIENT)
Dept: CARE COORDINATION | Facility: CLINIC | Age: 32
End: 2022-07-18

## 2022-07-18 DIAGNOSIS — Z65.9 PSYCHOSOCIAL PROBLEM: Primary | ICD-10-CM

## 2022-07-18 NOTE — PROGRESS NOTES
"Clinic Care Coordination Contact  [facility]/Voicemail       Clinical Data: Care Coordinator Outreach  Outreach attempted x 1.  Unable to leave VM as patient phone number states \"unable to accept calls at this time\".  Plan: Care Coordinator will try to reach patient again in 1-2 business days.    Berkley Arredondo [facility]  Clinic Care Coordination  Pronouns: she/her/hers  Transitions and Extended Care Clinics  [facility]  [email or web address]  [phone]    "

## 2022-07-20 ENCOUNTER — PATIENT OUTREACH (OUTPATIENT)
Dept: CARE COORDINATION | Facility: CLINIC | Age: 32
End: 2022-07-20

## 2022-07-20 NOTE — PROGRESS NOTES
Clinic Care Coordination Contact  [facility]/Voicemail       Clinical Data: Care Coordinator Outreach  Outreach attempted x 2.  Unable to leave message due to no working number  Plan: Care Coordinator will send unable to contact letter with care coordinator contact information via [employer]. Care Coordinator will do no further outreaches at this time.    No further outreaches will be made at this time unless a new referral is made or a change in the pt's status occurs. Patient was provided with [facility] contact information and encouraged to call with any questions or concerns.    IGNACIO Valverde  Clinic Care Coordination  Pronouns: she/her/hers  Transitions and Extended Care Clinics  [facility]  [email or web address]  [phone]

## 2022-07-20 NOTE — LETTER
[facility] CARE COORDINATION  Transitions Clinic    July 20, 2022    Kevin Rivas  [address]  [place] [address]      Dear Kevin,    I have been attempting to reach you. I was given a referral to work with you and provide any additional support you may need on achieving your health care related goals. I would appreciate if you would give me a call at [phone] to let me know if you would like to work together. I know that there are many things that can affect our ability to communicate and I hope we can work together.    All of us at the Transitions Clinic are invested in your health and are here to assist you in meeting your goals.     Sincerely,    IGNACIO Tirado  [facility]

## 2022-09-03 ENCOUNTER — HEALTH MAINTENANCE LETTER (OUTPATIENT)
Age: 32
End: 2022-09-03

## 2023-06-02 ENCOUNTER — HEALTH MAINTENANCE LETTER (OUTPATIENT)
Age: 33
End: 2023-06-02

## 2024-03-06 ENCOUNTER — APPOINTMENT (OUTPATIENT)
Dept: ULTRASOUND IMAGING | Facility: CLINIC | Age: 34
End: 2024-03-06
Attending: SOCIAL WORKER
Payer: COMMERCIAL

## 2024-03-06 ENCOUNTER — HOSPITAL ENCOUNTER (EMERGENCY)
Facility: CLINIC | Age: 34
Discharge: HOME | End: 2024-03-06
Attending: SOCIAL WORKER | Admitting: SOCIAL WORKER
Payer: COMMERCIAL

## 2024-03-06 ENCOUNTER — APPOINTMENT (OUTPATIENT)
Dept: CT IMAGING | Facility: CLINIC | Age: 34
End: 2024-03-06
Attending: SOCIAL WORKER
Payer: COMMERCIAL

## 2024-03-06 VITALS
BODY MASS INDEX: 30.53 KG/M2 | DIASTOLIC BLOOD PRESSURE: 115 MMHG | SYSTOLIC BLOOD PRESSURE: 154 MMHG | HEART RATE: 87 BPM | RESPIRATION RATE: 18 BRPM | HEIGHT: 66 IN | OXYGEN SATURATION: 98 % | TEMPERATURE: 98.1 F | WEIGHT: 189.99 LBS

## 2024-03-06 DIAGNOSIS — R74.01 TRANSAMINITIS: ICD-10-CM

## 2024-03-06 DIAGNOSIS — R11.10 VOMITING AND DIARRHEA: ICD-10-CM

## 2024-03-06 DIAGNOSIS — R17 ELEVATED BILIRUBIN: ICD-10-CM

## 2024-03-06 DIAGNOSIS — R94.5 ABNORMAL RESULTS OF LIVER FUNCTION STUDIES: ICD-10-CM

## 2024-03-06 DIAGNOSIS — R19.7 VOMITING AND DIARRHEA: ICD-10-CM

## 2024-03-06 DIAGNOSIS — K76.0 HEPATIC STEATOSIS: ICD-10-CM

## 2024-03-06 LAB
ALBUMIN SERPL BCG-MCNC: 5.1 G/DL (ref 3.5–5.2)
ALP SERPL-CCNC: 117 U/L (ref 40–150)
ALT SERPL W P-5'-P-CCNC: 152 U/L (ref 0–70)
ANION GAP SERPL CALCULATED.3IONS-SCNC: 15 MMOL/L (ref 7–15)
AST SERPL W P-5'-P-CCNC: 54 U/L (ref 0–45)
BASOPHILS # BLD AUTO: 0.1 10E3/UL (ref 0–0.2)
BASOPHILS NFR BLD AUTO: 1 %
BILIRUB SERPL-MCNC: 1.7 MG/DL
BUN SERPL-MCNC: 10.1 MG/DL (ref 6–20)
CALCIUM SERPL-MCNC: 9.3 MG/DL (ref 8.6–10)
CHLORIDE SERPL-SCNC: 99 MMOL/L (ref 98–107)
CREAT SERPL-MCNC: 0.94 MG/DL (ref 0.67–1.17)
DEPRECATED HCO3 PLAS-SCNC: 21 MMOL/L (ref 22–29)
EGFRCR SERPLBLD CKD-EPI 2021: >90 ML/MIN/1.73M2
EOSINOPHIL # BLD AUTO: 0.2 10E3/UL (ref 0–0.7)
EOSINOPHIL NFR BLD AUTO: 2 %
ERYTHROCYTE [DISTWIDTH] IN BLOOD BY AUTOMATED COUNT: 12.9 % (ref 10–15)
GLUCOSE SERPL-MCNC: 126 MG/DL (ref 70–99)
HCT VFR BLD AUTO: 50.7 % (ref 40–53)
HGB BLD-MCNC: 17.3 G/DL (ref 13.3–17.7)
HOLD SPECIMEN: NORMAL
HOLD SPECIMEN: NORMAL
IMM GRANULOCYTES # BLD: 0 10E3/UL
IMM GRANULOCYTES NFR BLD: 0 %
LIPASE SERPL-CCNC: 43 U/L (ref 13–60)
LYMPHOCYTES # BLD AUTO: 3.1 10E3/UL (ref 0.8–5.3)
LYMPHOCYTES NFR BLD AUTO: 35 %
MCH RBC QN AUTO: 28.2 PG (ref 26.5–33)
MCHC RBC AUTO-ENTMCNC: 34.1 G/DL (ref 31.5–36.5)
MCV RBC AUTO: 83 FL (ref 78–100)
MONOCYTES # BLD AUTO: 0.6 10E3/UL (ref 0–1.3)
MONOCYTES NFR BLD AUTO: 6 %
NEUTROPHILS # BLD AUTO: 4.9 10E3/UL (ref 1.6–8.3)
NEUTROPHILS NFR BLD AUTO: 56 %
NRBC # BLD AUTO: 0 10E3/UL
NRBC BLD AUTO-RTO: 0 /100
PLATELET # BLD AUTO: 370 10E3/UL (ref 150–450)
POTASSIUM SERPL-SCNC: 3.7 MMOL/L (ref 3.4–5.3)
PROT SERPL-MCNC: 8.7 G/DL (ref 6.4–8.3)
RBC # BLD AUTO: 6.14 10E6/UL (ref 4.4–5.9)
SODIUM SERPL-SCNC: 135 MMOL/L (ref 135–145)
WBC # BLD AUTO: 8.8 10E3/UL (ref 4–11)

## 2024-03-06 PROCEDURE — 85025 COMPLETE CBC W/AUTO DIFF WBC: CPT | Performed by: SOCIAL WORKER

## 2024-03-06 PROCEDURE — 80321 ALCOHOLS BIOMARKERS 1OR 2: CPT

## 2024-03-06 PROCEDURE — 96375 TX/PRO/DX INJ NEW DRUG ADDON: CPT

## 2024-03-06 PROCEDURE — 250N000009 HC RX 250: Performed by: SOCIAL WORKER

## 2024-03-06 PROCEDURE — 74177 CT ABD & PELVIS W/CONTRAST: CPT

## 2024-03-06 PROCEDURE — 99285 EMERGENCY DEPT VISIT HI MDM: CPT | Mod: 25

## 2024-03-06 PROCEDURE — 86708 HEPATITIS A ANTIBODY: CPT

## 2024-03-06 PROCEDURE — 96374 THER/PROPH/DIAG INJ IV PUSH: CPT | Mod: 59

## 2024-03-06 PROCEDURE — 83690 ASSAY OF LIPASE: CPT | Performed by: SOCIAL WORKER

## 2024-03-06 PROCEDURE — 96361 HYDRATE IV INFUSION ADD-ON: CPT

## 2024-03-06 PROCEDURE — 83540 ASSAY OF IRON: CPT

## 2024-03-06 PROCEDURE — 258N000003 HC RX IP 258 OP 636: Performed by: SOCIAL WORKER

## 2024-03-06 PROCEDURE — 87340 HEPATITIS B SURFACE AG IA: CPT

## 2024-03-06 PROCEDURE — 250N000011 HC RX IP 250 OP 636: Performed by: SOCIAL WORKER

## 2024-03-06 PROCEDURE — 83550 IRON BINDING TEST: CPT

## 2024-03-06 PROCEDURE — 36415 COLL VENOUS BLD VENIPUNCTURE: CPT | Performed by: SOCIAL WORKER

## 2024-03-06 PROCEDURE — 80053 COMPREHEN METABOLIC PANEL: CPT | Performed by: SOCIAL WORKER

## 2024-03-06 PROCEDURE — 250N000013 HC RX MED GY IP 250 OP 250 PS 637: Performed by: SOCIAL WORKER

## 2024-03-06 PROCEDURE — 76705 ECHO EXAM OF ABDOMEN: CPT

## 2024-03-06 PROCEDURE — C9113 INJ PANTOPRAZOLE SODIUM, VIA: HCPCS | Performed by: SOCIAL WORKER

## 2024-03-06 PROCEDURE — 84443 ASSAY THYROID STIM HORMONE: CPT

## 2024-03-06 RX ORDER — ONDANSETRON 4 MG/1
4 TABLET, FILM COATED ORAL EVERY 8 HOURS PRN
Qty: 9 TABLET | Refills: 0 | Status: SHIPPED | OUTPATIENT
Start: 2024-03-06 | End: 2024-03-09

## 2024-03-06 RX ORDER — MAGNESIUM HYDROXIDE/ALUMINUM HYDROXICE/SIMETHICONE 120; 1200; 1200 MG/30ML; MG/30ML; MG/30ML
15 SUSPENSION ORAL ONCE
Status: COMPLETED | OUTPATIENT
Start: 2024-03-06 | End: 2024-03-06

## 2024-03-06 RX ORDER — OMEPRAZOLE 40 MG/1
40 CAPSULE, DELAYED RELEASE ORAL DAILY
Qty: 14 CAPSULE | Refills: 0 | Status: SHIPPED | OUTPATIENT
Start: 2024-03-06 | End: 2024-03-21

## 2024-03-06 RX ORDER — SUCRALFATE 1 G/1
1 TABLET ORAL 4 TIMES DAILY
Qty: 28 TABLET | Refills: 0 | Status: SHIPPED | OUTPATIENT
Start: 2024-03-06 | End: 2024-03-13

## 2024-03-06 RX ORDER — IOPAMIDOL 755 MG/ML
95 INJECTION, SOLUTION INTRAVASCULAR ONCE
Status: COMPLETED | OUTPATIENT
Start: 2024-03-06 | End: 2024-03-06

## 2024-03-06 RX ORDER — ONDANSETRON 2 MG/ML
4 INJECTION INTRAMUSCULAR; INTRAVENOUS ONCE
Status: COMPLETED | OUTPATIENT
Start: 2024-03-06 | End: 2024-03-06

## 2024-03-06 RX ADMIN — ALUMINUM HYDROXIDE, MAGNESIUM HYDROXIDE, AND DIMETHICONE 15 ML: 200; 20; 200 SUSPENSION ORAL at 07:39

## 2024-03-06 RX ADMIN — IOPAMIDOL 95 ML: 755 INJECTION, SOLUTION INTRAVENOUS at 07:29

## 2024-03-06 RX ADMIN — PANTOPRAZOLE SODIUM 40 MG: 40 INJECTION, POWDER, FOR SOLUTION INTRAVENOUS at 07:39

## 2024-03-06 RX ADMIN — FAMOTIDINE 20 MG: 10 INJECTION, SOLUTION INTRAVENOUS at 07:39

## 2024-03-06 RX ADMIN — SODIUM CHLORIDE 1000 ML: 9 INJECTION, SOLUTION INTRAVENOUS at 07:39

## 2024-03-06 RX ADMIN — SODIUM CHLORIDE 64 ML: 9 INJECTION, SOLUTION INTRAVENOUS at 07:29

## 2024-03-06 ASSESSMENT — COLUMBIA-SUICIDE SEVERITY RATING SCALE - C-SSRS
2. HAVE YOU ACTUALLY HAD ANY THOUGHTS OF KILLING YOURSELF IN THE PAST MONTH?: NO
6. HAVE YOU EVER DONE ANYTHING, STARTED TO DO ANYTHING, OR PREPARED TO DO ANYTHING TO END YOUR LIFE?: NO
1. IN THE PAST MONTH, HAVE YOU WISHED YOU WERE DEAD OR WISHED YOU COULD GO TO SLEEP AND NOT WAKE UP?: NO

## 2024-03-06 ASSESSMENT — ACTIVITIES OF DAILY LIVING (ADL)
ADLS_ACUITY_SCORE: 35
ADLS_ACUITY_SCORE: 35
ADLS_ACUITY_SCORE: 33

## 2024-03-06 NOTE — ED PROVIDER NOTES
"  History     Chief Complaint:  Abdominal Pain       HPI   Kevin Rivas is a 33 year old male who presents for evaluation of abdominal pain.Patient reports that he has been experiencing a loss of appetite for the past 3 days and has been unable to sleep. He has additionally vomited 3 times since yesterday and this morning vomited with streaks of blood in his saliva prompting him to come to the ED. He denies any abdominal pain at rest, only has epigastric abdominal pain after vomiting. He additionally endorses dizziness. He reports no difficulty with drinking fluids. Patient states that he had diarrhea on two days ago but it has since gone away and he is now experiencing no defecation or urinary problems. Patient does not drink regularly and is not around any illnesses at home. He denies any black stool, coffee ground emesis, fever, or cough. No alcohol use.     Independent Historian:    None - Patient Only    Review of External Notes:  Reviewed office visit from 5/2023: \"  - Here for physical while in treatment at [facility] ([facility])  - Meth use disorder in very early recovery. Discussed medication options including combination of naltrexone and wellbutrin. Ultimately think this would be helpful for patient in regards to both mood, meth use and nicotine use. Discussed importance to speak with psychiatrist prior to initiation.  - Elevated blood pressure today. Discussed lifestyle modification. Recommend follow-up to check blood pressure.  - Bipolar 2 disorder on zyprexa. Follows with psychiatry.  - HCM: lipid panel, A1c and BMP today. \"    Allergies:  No Known Allergies     Relevant Medical History:  Depression  Substance use disorder    Physical Exam   Patient Vitals for the past 24 hrs:   BP Temp Temp src Pulse Resp SpO2 Height Weight   03/06/24 0915 (!) 139/108 -- -- 78 -- 97 % -- --   03/06/24 0748 (!) 155/76 -- -- -- -- 95 % -- --   03/06/24 0714 (!) 178/92 -- -- 69 -- 99 % -- --   03/06/24 0646 (!) 163/106 " "-- -- -- -- -- -- --   03/06/24 0645 -- 98.1  F (36.7  C) Temporal 84 18 99 % 1.676 m (5' 6\") 86.2 kg (189 lb 15.9 oz)        Physical Exam  General: Overall stable and nontoxic appearing  HEENT: Conjunctivae clear, no scleral icterus, mucous membranes moist  Neuro: Alert, moving all extremities equally with intention  CV: Regular rate and rhythm, radial and DP pulses equal  Respiratory: No signs of respiratory distress, lungs clear to auscultation bilaterally   Abdomen: Soft, without rigidity or rebound throughout   No RUQ tenderness   No epigastric tenderness   No RLQ tenderness   No CVA tenderness   MSK: No lower extremity swelling or tenderness     Emergency Department Course     Laboratory: Imaging:   Labs Ordered and Resulted from Time of ED Arrival to Time of ED Departure   COMPREHENSIVE METABOLIC PANEL - Abnormal       Result Value    Sodium 135      Potassium 3.7      Carbon Dioxide (CO2) 21 (*)     Anion Gap 15      Urea Nitrogen 10.1      Creatinine 0.94      GFR Estimate >90      Calcium 9.3      Chloride 99      Glucose 126 (*)     Alkaline Phosphatase 117      AST 54 (*)      (*)     Protein Total 8.7 (*)     Albumin 5.1      Bilirubin Total 1.7 (*)    CBC WITH PLATELETS AND DIFFERENTIAL - Abnormal    WBC Count 8.8      RBC Count 6.14 (*)     Hemoglobin 17.3      Hematocrit 50.7      MCV 83      MCH 28.2      MCHC 34.1      RDW 12.9      Platelet Count 370      % Neutrophils 56      % Lymphocytes 35      % Monocytes 6      % Eosinophils 2      % Basophils 1      % Immature Granulocytes 0      NRBCs per 100 WBC 0      Absolute Neutrophils 4.9      Absolute Lymphocytes 3.1      Absolute Monocytes 0.6      Absolute Eosinophils 0.2      Absolute Basophils 0.1      Absolute Immature Granulocytes 0.0      Absolute NRBCs 0.0     LIPASE - Normal    Lipase 43       US Abdomen Limited   Final Result   IMPRESSION:   1.  No calcified gallstone or evidence of cholecystitis.   2.  Hepatic steatosis.    "   MARTA HILL MD            SYSTEM ID:  [id number]      CT Abdomen Pelvis w Contrast   Preliminary Result   IMPRESSION:    1.  No convincing acute process within the abdomen or pelvis. No bowel   obstruction.   2.  Hepatic steatosis.   3.  Common bile duct diameter is at the upper limits of normal,   possibly physiologic. No calcified gallstone or filling defect by CT.              Emergency Department Course & Assessments:    Interventions:  Medications   ondansetron (ZOFRAN) injection 4 mg (4 mg Intravenous Not Given 3/6/24 0751)   famotidine (PEPCID) injection 20 mg (20 mg Intravenous $Given 3/6/24 0739)   pantoprazole (PROTONIX) IV push injection 40 mg (40 mg Intravenous $Given 3/6/24 0739)   alum & mag hydroxide-simethicone (MAALOX) suspension 15 mL (15 mLs Oral $Given 3/6/24 0739)   sodium chloride 0.9% BOLUS 1,000 mL (0 mLs Intravenous Stopped 3/6/24 09)   iopamidol (ISOVUE-370) solution 95 mL (95 mLs Intravenous $Given 3/6/24 0729)   sodium chloride 0.9 % bag 500mL for CT scan flush use (64 mLs Intravenous $Given 3/6/24 0729)        Assessments, Independent Interpretation, Consult/Discussion of ManagementTests:  ED Course as of 24 0927   Wed Mar 06, 2024   0704 I obtained patient history and performed a physical exam.    0737 ALT(!): 152   0737 AST(!): 54   0927 Reassessed and discussed all the results including fatty liver, transaminitis, and elevated bilirubin       Social Determinants of Health affecting care:  None    Disposition:  The patient was discharged to home.     Impression & Plan      Medical Decision Makiny M with history of depression not on any other medications who presented to the emergency room with a chief complaint of 3 days of decreased appetite and vomiting after eating, today with some streaks of red.  He denied any dark tarry stools or blood in the stools, no abdominal pain at rest only after vomiting.  Low suspicion for upper GI bleed or lower GI bleed.  CT  abdomen pelvis showed borderline dilated CBD, RUQ without any signs of cholecystitis. No RUQ tenderness on exam.  Transaminitis noted however not significantly elevated to the level of hepatitis, possibly iso hepatic steatosis. Will require follow up.   Lipase without any elevation, low suspicion for pancreatitis.  Without any significant abdominal pain on palpation, young otherwise no risk factors, suspicion for mesenteric ischemia patient felt better after receiving GI cocktail and fluids. Doubt ACS.  He has been tolerating fluids at home. Instructed to follow-up outpatient, referral to GI placed.  He felt relief with GI cocktail omeprazole and famotidine, have given prescriptions for these as well as Carafate.  He will return for any coffee-ground emesis, significantly bloody emesis, blood in stool or dark tarry stools, inability to tolerate fluids, fever.  Discharged in stable condition.    Diagnosis:    ICD-10-CM    1. Transaminitis  R74.01 Adult GI  Referral - Consult Only      2. Hepatic steatosis  K76.0 Adult GI  Referral - Consult Only      3. Elevated bilirubin  R17 Adult GI  Referral - Consult Only      4. Vomiting and diarrhea  R11.10     R19.7            Discharge Medications:  New Prescriptions    OMEPRAZOLE (PRILOSEC) 40 MG DR CAPSULE    Take 1 capsule (40 mg) by mouth daily for 14 days    ONDANSETRON (ZOFRAN) 4 MG TABLET    Take 1 tablet (4 mg) by mouth every 8 hours as needed for nausea    SUCRALFATE (CARAFATE) 1 GM TABLET    Take 1 tablet (1 g) by mouth 4 times daily for 7 days        Scribe Disclosure:  Rohini RUSSELL, am serving as a scribe at 7:25 AM on 3/6/2024 to document services personally performed by Fabi Rosado MD based on my observations and the provider's statements to me.    3/6/2024   Fabi Rosado MD Wu Klasek, Connie, MD  03/06/24 3819     No

## 2024-03-06 NOTE — ED NOTES
Took over care of pt.  Pt pointed to upper abdomen for location of pain. Says he has stabbing pain when vomiting, but denies pain at rest.  Denies nausea.  Pt states he has had 3 episodes of diarrhea since Monday, unable to tolerate PO since Monday.  States he ate normal diet over the weekend with no unusual or new foods.

## 2024-03-06 NOTE — LETTER
March 6, 2024      To Whom It May Concern:      Kevin Rivas was seen in our Emergency Department today, 03/06/24.  I expect his condition to improve over the next 1 days.  He may return to work March 7, 2024       Sincerely,    Fabi Rosado MD

## 2024-03-06 NOTE — ED TRIAGE NOTES
Here for concern of mid upper abdominal pain since Monday, but is worse today. Associated with n/v this morning with blood mix with saliva. Also c/o decreased appetite and difficulty sleeping x2 days. ABCs intact.      Triage Assessment (Adult)       Row Name 03/06/24 0642          Triage Assessment    Airway WDL WDL        Respiratory WDL    Respiratory WDL WDL        Cardiac WDL    Cardiac WDL WDL

## 2024-03-06 NOTE — DISCHARGE INSTRUCTIONS
You are seen emergency department for vomiting and belly pain.  Exam is overall reassuring, we do not see signs of acute emergency at this time.  Please follow-up with your primary care provider to have a recheck of your labs.    Have also sent a referral to the GI doctors, they should be giving you a call is that you can see them in follow-up as well.  It looks like you have some fatty changes to your liver. I have sent prescriptions for an anti-nausea pill as well as an anti-acid and a medicine that coats your stomach to the pharmacy here at [facility].     Return to the emergency room if you have vomiting so much that you cannot keep down any fluids, if you are vomiting up what looks like coffee grounds, have lots of blood in the vomit, or other concerning symptoms.

## 2024-03-08 NOTE — CONFIDENTIAL NOTE
DIAGNOSIS:    ransaminitis   Hepatic steatosis   Elevated bilirubin      Appt Date:  03.12.2024    NOTES STATUS DETAILS   OFFICE NOTE from referring provider Internal 03.06.2024 Fabi Rosado MD   OFFICE NOTES from other specialists     DISCHARGE SUMMARY from hospital Internal 03.06.2024  Fabi Rosado MD    MEDICATION LIST Internal    LIVER BIOSPY (IF APPLICABLE)      PATHOLOGY REPORTS      IMAGING     ENDOSCOPY (IF AVAILABLE)     COLONOSCOPY (IF AVAILABLE)     ULTRASOUND LIVER     CT OF ABDOMEN     MRI OF LIVER     FIBROSCAN, US ELASTOGRAPHY, FIBROSIS SCAN, MR ELASTOGRAPHY     LABS     HEPATIC PANEL (LIVER PANEL)     BASIC METABOLIC PANEL Care Everywhere 05.19.2023    COMPLETE METABOLIC PANEL Internal 03.06.2024   COMPLETE BLOOD COUNT (CBC) Internal 03.06.20244    INTERNATIONAL NORMALIZED RATIO (INR)     HEPATITIS C ANTIBODY     HEPATITIS C VIRAL LOAD/PCR     HEPATITIS C GENOTYPE     HEPATITIS B SURFACE ANTIGEN     HEPATITIS B SURFACE ANTIBODY     HEPATITIS B DNA QUANT LEVEL     HEPATITIS B CORE ANTIBODY

## 2024-03-10 DIAGNOSIS — R94.5 ABNORMAL RESULTS OF LIVER FUNCTION STUDIES: Primary | ICD-10-CM

## 2024-03-10 LAB
HAV AB SER QL IA: REACTIVE
HBV SURFACE AG SERPL QL IA: NONREACTIVE
IRON BINDING CAPACITY (ROCHE): 359 UG/DL (ref 240–430)
IRON SATN MFR SERPL: 36 % (ref 15–46)
IRON SERPL-MCNC: 128 UG/DL (ref 61–157)
TSH SERPL DL<=0.005 MIU/L-ACNC: 2.19 UIU/ML (ref 0.3–4.2)

## 2024-03-10 NOTE — PROGRESS NOTES
[facility] Hepatology    New Patient Visit    Referring provider:  Fabi Bland  Chief complaint:  Hepatic Steatosis + Abnormal Liver Studies    Assessment  33 year old male with past medical history of meth use, depression and obesity who presents for hepatic steatosis and abnormal liver studies    #. Hepatic steatosis  #. Abnormal liver studies  #. Hyperlipidemia  #. Prediabetes  #. Obesity  Patient presented in March 2024 with abnormal liver studies that are hepatocellular in nature.  INR within normal limits which argues against synthetic dysfunction.  Patient with normal platelets and  which argues against portal hypertension.  FibroScan performed in clinic today without any evidence of advanced fibrosis.  The patient's AST has normalized and the patient's ALT remains mildly elevated. He does not have hepatitis B, his thyroid studies are normal and he has no evidence of iron overload.    The patient has multiple metabolic risk factors including obesity, prediabetes and hyperlipidemia.  We spent the majority of the visit discussing lifestyle and dietary modifications to promote healthy weight loss which would benefit all of his metabolic comorbid conditions.  He would also benefit from establishing care with a primary care doctor to facilitate this.     #. Hematochezia  Patient with intermittent hematochezia for several years.  No other red flag symptoms.  No family history of colon cancer.  Most likely etiology is hemorrhoids however he would benefit from a colonoscopy for further evaluation    #. Tobacco  Patient was previously using a quarter of a pack of cigarettes per day and switched to vaping 6% and using 60 mL of tobacco every 2 weeks.  The patient is interested in cutting down.  We discussed modes for nicotine cessation and nicotine replacement therapy.  Patient is willing and interested in a nicotine patch. 5 minutes was spent on counseling the patient on tobacco cessation and providing  education    Plan  -- Follow-up FORTINO, alpha-1 antitrypsin, hepatitis B, hepatitis C and celiac testing  -- No contraindication from a hepatology perspective to prescribe a statin  -- Lifestyle and dietary changes with the goal of 7-10% weight loss   * Limit portion sizes and exclude MASLD promoting components: carbohydrates, sugars, processed food, foods & beverages high in added fructose   * Recommend aerobic exercise and resistance training at least three days a week with gradual increase over time  * If inadequate response to lifestyle therapies and ongoing BMI >27 (with comorbid conditions) or BMI > 30, recommend weight loss medication initiation, such as semaglutide or liraglutide  -- Referral to PCP to establish care  -- Colonoscopy for workup of hematochezia; patient prefers to go to [facility]    RTC: PRN; follow up with PCP for management of hepatic steatosis and metabolic syndrome    Jhoana Fernández MD (Lizzie)  Advanced & Transplant Hepatology  [facility]    I spent 60 minutes on this encounter performing the following: reviewing the patient's medical record (clinic visits, hospital records, lab results, imaging and procedural documentation), history taking, physical exam and documentation on the date of the encounter. I also spent part of the time in coordination of care and counseling.    HPI:  The patient presented with his mother.  The patient denied an interest in an .    The patient presented to the emergency room on 3/6/2024 for abdominal pain, decreased appetite and emesis.  At that time he was noted to have fatty changes in his liver and was referred to hepatology.  He reports that at this time his stools were darker in color.    Since that ER visit the patient reports doing well.  He denies any fevers, chills, nausea, emesis, abdominal pain or diarrhea.  He has been having intermittent bright red blood per rectum for several years but has not previously  reported it.  He denies any unintentional weight loss.  He denies any prior abdominal surgeries.  He denies any dysphagia or odynophagia.    The patient has mental health issues that had led to inpatient psychologic rehab in the summer 2023.  Around that time he was started on olanzapine and increased his weight from 140 pounds to 180 pounds.    He has no signs or symptoms of decompensated liver disease.  He has no known family history of liver disease.  He denies any history of alcohol overuse.    Medical hx Surgical hx   Past Medical History:   Diagnosis Date     Bipolar 2 disorder (H)      Depression      Fracture of elbow      Hepatitis     ? in [facility]     Substance use disorder     Meth use      Past Surgical History:   Procedure Laterality Date     none            Medications  Current Outpatient Medications   Medication Sig Dispense Refill     Acetaminophen (TYLENOL PO) Take 500-1,000 mg by mouth every 8 hours as needed       ibuprofen (ADVIL/MOTRIN) 200 MG capsule Take 200 mg by mouth every 4 hours as needed for fever       nicotine (NICODERM CQ) 7 MG/24HR 24 hr patch Place 1 patch onto the skin every 24 hours 10 patch 0     OLANZapine (ZYPREXA) 7.5 MG tablet Take 7.5 mg by mouth daily       omeprazole (PRILOSEC) 40 MG DR capsule Take 1 capsule (40 mg) by mouth daily for 14 days 14 capsule 0     sucralfate (CARAFATE) 1 GM tablet Take 1 tablet (1 g) by mouth 4 times daily for 7 days 28 tablet 0     Cholecalciferol (VITAMIN D3 PO) Take 1,000 Units by mouth Reported on 3/26/2017 (Patient not taking: Reported on 3/12/2024)       Multiple Vitamins-Minerals (CENTRUM PO) Reported on 3/26/2017 (Patient not taking: Reported on 3/12/2024)         Allergies  No Known Allergies    Family hx Social hx   Family History   Problem Relation Age of Onset     Hypertension Mother      Cerebrovascular Disease Maternal Grandfather      Hypertension Maternal Grandfather      No known family history of liver disease    Sister  "with depression.  16-year-old half sibling with OCD and generalized anxiety disorder Social History     Tobacco Use     Smoking status: Every Day     Types: Vaping Device     Smokeless tobacco: Never   Substance Use Topics     Alcohol use: No     Alcohol/week: 0.0 standard drinks of alcohol     Drug use: No       - Lives with his aunt and another individual [place]  - Alcohol: Denies  - Tobacco: Previously smoked a quarter of a pack per day but now vapes 6% nicotine.  He goes through a 60 milliliters cartridge every 2 weeks.  - Drugs: History of methamphetamine use     Review of systems  A 10-point review of systems was negative.    Examination  BP (!) 137/99 (BP Location: Left arm, Patient Position: Sitting, Cuff Size: Adult Large)   Pulse 71   Temp 98.4  F (36.9  C) (Oral)   Resp 18   Ht 1.676 m (5' 5.98\")   Wt 84.2 kg (185 lb 9.6 oz)   SpO2 96%   BMI 29.97 kg/m     Body mass index is 29.97 kg/m .    Gen-NAD  Eye- EOMI  ENT- MMM  CVS- RRR, no murmurs  RS- CTA bilaterally  Abd-obese, soft, nontender.  Extr- 2+ radial pulses bilaterally, no lower extremity edema bilaterally  MS- hands without clubbing  Neuro- A+Ox3, no asterixis  Skin- no rash or jaundice  Psych- normal mood    Laboratory  BMPRecent Labs   Lab Test 03/12/24  0728 03/06/24  0651 07/14/22  1205    135 136   POTASSIUM 3.6 3.7 3.1*   CHLORIDE 104 99 107   EVA 9.3 9.3 8.7   CO2 23 21* 24   BUN 9.3 10.1 18   CR 0.98 0.94 0.88   * 126* 101*     CBC  Recent Labs   Lab Test 03/12/24  0728 03/06/24  0651 07/14/22  1205 07/14/22  1205 04/03/21  1144   WBC 5.9 8.8  --  9.1 9.7   RBC 5.90 6.14*  --  4.86  --    HGB 16.5 17.3   < > 13.8  --    HCT 48.4 50.7  --  39.5*  --    MCV 82 83  --  81  --    MCH 28.0 28.2  --  28.4  --    MCHC 34.1 34.1  --  34.9  --    RDW 12.5 12.9  --  12.3  --     370  --  319  --     < > = values in this interval not displayed.     Liver Enzymes   Recent Labs   Lab Test 03/12/24  0728   PROTTOTAL 8.4* "   ALBUMIN 4.9   BILITOTAL 1.1   ALKPHOS 95   AST 43   ALT 95*      INR   INR   Date Value Ref Range Status   03/12/2024 1.03 0.85 - 1.15 Final      Radiology  Fibroscan 3/12/2024  5.9kPa IQR 12%  , IQR 79    Abdominal US 3/6/2024  1.  No calcified gallstone or evidence of cholecystitis.  2.  Hepatic steatosis.  * There is no intrahepatic biliary dilatation. The common duct near the catherine hepatis measures 4 mm.    CT Abdomen Pelvis w/Contrast 3/6/2024  1.  No convincing acute process within the abdomen or pelvis. No bowel obstruction.  2.  Hepatic steatosis.  3.  Common bile duct diameter is at the upper limits of normal, possibly physiologic. No calcified gallstone or filling defect by CT.

## 2024-03-12 ENCOUNTER — LAB (OUTPATIENT)
Dept: LAB | Facility: CLINIC | Age: 34
End: 2024-03-12
Attending: INTERNAL MEDICINE
Payer: COMMERCIAL

## 2024-03-12 ENCOUNTER — PRE VISIT (OUTPATIENT)
Dept: GASTROENTEROLOGY | Facility: CLINIC | Age: 34
End: 2024-03-12

## 2024-03-12 ENCOUNTER — OFFICE VISIT (OUTPATIENT)
Dept: GASTROENTEROLOGY | Facility: CLINIC | Age: 34
End: 2024-03-12
Attending: SOCIAL WORKER
Payer: COMMERCIAL

## 2024-03-12 VITALS
WEIGHT: 185.6 LBS | OXYGEN SATURATION: 96 % | DIASTOLIC BLOOD PRESSURE: 99 MMHG | BODY MASS INDEX: 29.83 KG/M2 | SYSTOLIC BLOOD PRESSURE: 137 MMHG | RESPIRATION RATE: 18 BRPM | HEIGHT: 66 IN | HEART RATE: 71 BPM | TEMPERATURE: 98.4 F

## 2024-03-12 DIAGNOSIS — K76.0 HEPATIC STEATOSIS: ICD-10-CM

## 2024-03-12 DIAGNOSIS — R94.5 ABNORMAL RESULTS OF LIVER FUNCTION STUDIES: ICD-10-CM

## 2024-03-12 DIAGNOSIS — R74.01 TRANSAMINITIS: ICD-10-CM

## 2024-03-12 DIAGNOSIS — R17 ELEVATED BILIRUBIN: ICD-10-CM

## 2024-03-12 DIAGNOSIS — F17.200 TOBACCO DEPENDENCE SYNDROME: Primary | ICD-10-CM

## 2024-03-12 DIAGNOSIS — K92.1 HEMATOCHEZIA: ICD-10-CM

## 2024-03-12 LAB
ALBUMIN SERPL BCG-MCNC: 4.9 G/DL (ref 3.5–5.2)
ALP SERPL-CCNC: 95 U/L (ref 40–150)
ALT SERPL W P-5'-P-CCNC: 95 U/L (ref 0–70)
ANION GAP SERPL CALCULATED.3IONS-SCNC: 12 MMOL/L (ref 7–15)
AST SERPL W P-5'-P-CCNC: 43 U/L (ref 0–45)
BILIRUB DIRECT SERPL-MCNC: 0.24 MG/DL (ref 0–0.3)
BILIRUB SERPL-MCNC: 1.1 MG/DL
BUN SERPL-MCNC: 9.3 MG/DL (ref 6–20)
CALCIUM SERPL-MCNC: 9.3 MG/DL (ref 8.6–10)
CHLORIDE SERPL-SCNC: 104 MMOL/L (ref 98–107)
CHOLEST SERPL-MCNC: 227 MG/DL
CREAT SERPL-MCNC: 0.98 MG/DL (ref 0.67–1.17)
DEPRECATED HCO3 PLAS-SCNC: 23 MMOL/L (ref 22–29)
EGFRCR SERPLBLD CKD-EPI 2021: >90 ML/MIN/1.73M2
ERYTHROCYTE [DISTWIDTH] IN BLOOD BY AUTOMATED COUNT: 12.5 % (ref 10–15)
GLUCOSE SERPL-MCNC: 133 MG/DL (ref 70–99)
HBA1C MFR BLD: 6.3 %
HBV CORE AB SERPL QL IA: NONREACTIVE
HBV SURFACE AB SERPL IA-ACNC: <3.5 M[IU]/ML
HBV SURFACE AB SERPL IA-ACNC: NONREACTIVE M[IU]/ML
HCT VFR BLD AUTO: 48.4 % (ref 40–53)
HCV AB SERPL QL IA: NONREACTIVE
HDLC SERPL-MCNC: 45 MG/DL
HGB BLD-MCNC: 16.5 G/DL (ref 13.3–17.7)
INR PPP: 1.03 (ref 0.85–1.15)
LABORATORY REPORT: NORMAL
LDLC SERPL CALC-MCNC: 151 MG/DL
MCH RBC QN AUTO: 28 PG (ref 26.5–33)
MCHC RBC AUTO-ENTMCNC: 34.1 G/DL (ref 31.5–36.5)
MCV RBC AUTO: 82 FL (ref 78–100)
NONHDLC SERPL-MCNC: 182 MG/DL
PETH INTERPRETATION: NORMAL
PLATELET # BLD AUTO: 335 10E3/UL (ref 150–450)
PLPETH BLD-MCNC: <10 NG/ML
POPETH BLD-MCNC: <10 NG/ML
POTASSIUM SERPL-SCNC: 3.6 MMOL/L (ref 3.4–5.3)
PROT SERPL-MCNC: 8.4 G/DL (ref 6.4–8.3)
RBC # BLD AUTO: 5.9 10E6/UL (ref 4.4–5.9)
SODIUM SERPL-SCNC: 139 MMOL/L (ref 135–145)
TRIGL SERPL-MCNC: 155 MG/DL
WBC # BLD AUTO: 5.9 10E3/UL (ref 4–11)

## 2024-03-12 PROCEDURE — 82103 ALPHA-1-ANTITRYPSIN TOTAL: CPT | Mod: 90 | Performed by: PATHOLOGY

## 2024-03-12 PROCEDURE — 82248 BILIRUBIN DIRECT: CPT | Performed by: PATHOLOGY

## 2024-03-12 PROCEDURE — 99406 BEHAV CHNG SMOKING 3-10 MIN: CPT | Performed by: INTERNAL MEDICINE

## 2024-03-12 PROCEDURE — 36415 COLL VENOUS BLD VENIPUNCTURE: CPT | Performed by: PATHOLOGY

## 2024-03-12 PROCEDURE — G0463 HOSPITAL OUTPT CLINIC VISIT: HCPCS | Mod: 25 | Performed by: INTERNAL MEDICINE

## 2024-03-12 PROCEDURE — 86704 HEP B CORE ANTIBODY TOTAL: CPT | Performed by: INTERNAL MEDICINE

## 2024-03-12 PROCEDURE — 86803 HEPATITIS C AB TEST: CPT | Performed by: INTERNAL MEDICINE

## 2024-03-12 PROCEDURE — 81332 SERPINA1 GENE: CPT | Mod: 90 | Performed by: PATHOLOGY

## 2024-03-12 PROCEDURE — 86364 TISS TRNSGLTMNASE EA IG CLAS: CPT | Performed by: INTERNAL MEDICINE

## 2024-03-12 PROCEDURE — 91200 LIVER ELASTOGRAPHY: CPT | Mod: 26 | Performed by: PHYSICIAN ASSISTANT

## 2024-03-12 PROCEDURE — 86038 ANTINUCLEAR ANTIBODIES: CPT | Performed by: INTERNAL MEDICINE

## 2024-03-12 PROCEDURE — 80053 COMPREHEN METABOLIC PANEL: CPT | Performed by: PATHOLOGY

## 2024-03-12 PROCEDURE — 99205 OFFICE O/P NEW HI 60 MIN: CPT | Mod: 25 | Performed by: INTERNAL MEDICINE

## 2024-03-12 PROCEDURE — 83036 HEMOGLOBIN GLYCOSYLATED A1C: CPT | Performed by: INTERNAL MEDICINE

## 2024-03-12 PROCEDURE — 99000 SPECIMEN HANDLING OFFICE-LAB: CPT | Performed by: PATHOLOGY

## 2024-03-12 PROCEDURE — 86706 HEP B SURFACE ANTIBODY: CPT | Performed by: INTERNAL MEDICINE

## 2024-03-12 PROCEDURE — 80061 LIPID PANEL: CPT | Performed by: PATHOLOGY

## 2024-03-12 PROCEDURE — 85610 PROTHROMBIN TIME: CPT | Performed by: PATHOLOGY

## 2024-03-12 PROCEDURE — 85027 COMPLETE CBC AUTOMATED: CPT | Performed by: PATHOLOGY

## 2024-03-12 RX ORDER — OLANZAPINE 7.5 MG/1
7.5 TABLET, FILM COATED ORAL DAILY
COMMUNITY
End: 2024-03-21

## 2024-03-12 ASSESSMENT — PAIN SCALES - GENERAL: PAINLEVEL: NO PAIN (0)

## 2024-03-12 NOTE — NURSING NOTE
"Chief Complaint   Patient presents with    Consult     Elevated LFT's and abnormal liver imaging      Vital signs:  Temp: 98.4  F (36.9  C) Temp src: Oral BP: (!) 137/99 Pulse: 71   Resp: 18 SpO2: 96 %     Height: 167.6 cm (5' 5.98\") Weight: 84.2 kg (185 lb 9.6 oz)  Estimated body mass index is 29.97 kg/m  as calculated from the following:    Height as of this encounter: 1.676 m (5' 5.98\").    Weight as of this encounter: 84.2 kg (185 lb 9.6 oz).      Barbara Ferrer, [facility]  3/12/2024 7:44 AM    "

## 2024-03-12 NOTE — LETTER
3/12/2024         RE: Kevin Rivas  [address] [place] [address]        Dear Colleague,    Thank you for referring your patient, Kevin Rivas, to the [facility] [place]. Please see a copy of my visit note below.    [facility] Hepatology    New Patient Visit    Referring provider:  Fabi Bland  Chief complaint:  Hepatic Steatosis + Abnormal Liver Studies    Assessment  33 year old male with past medical history of meth use, depression and obesity who presents for hepatic steatosis and abnormal liver studies    #. Hepatic steatosis  #. Abnormal liver studies  #. Hyperlipidemia  #. Prediabetes  #. Obesity  Patient presented in March 2024 with abnormal liver studies that are hepatocellular in nature.  INR within normal limits which argues against synthetic dysfunction.  Patient with normal platelets and  which argues against portal hypertension.  FibroScan performed in clinic today without any evidence of advanced fibrosis.  The patient's AST has normalized and the patient's ALT remains mildly elevated. He does not have hepatitis B, his thyroid studies are normal and he has no evidence of iron overload.    The patient has multiple metabolic risk factors including obesity, prediabetes and hyperlipidemia.  We spent the majority of the visit discussing lifestyle and dietary modifications to promote healthy weight loss which would benefit all of his metabolic comorbid conditions.  He would also benefit from establishing care with a primary care doctor to facilitate this.     #. Hematochezia  Patient with intermittent hematochezia for several years.  No other red flag symptoms.  No family history of colon cancer.  Most likely etiology is hemorrhoids however he would benefit from a colonoscopy for further evaluation    #. Tobacco  Patient was previously using a quarter of a pack of cigarettes per day and switched to vaping 6% and using 60 mL of tobacco every 2  weeks.  The patient is interested in cutting down.  We discussed modes for nicotine cessation and nicotine replacement therapy.  Patient is willing and interested in a nicotine patch. 5 minutes was spent on counseling the patient on tobacco cessation and providing education    Plan  -- Follow-up FROTINO, alpha-1 antitrypsin, hepatitis B, hepatitis C and celiac testing  -- No contraindication from a hepatology perspective to prescribe a statin  -- Lifestyle and dietary changes with the goal of 7-10% weight loss   * Limit portion sizes and exclude MASLD promoting components: carbohydrates, sugars, processed food, foods & beverages high in added fructose   * Recommend aerobic exercise and resistance training at least three days a week with gradual increase over time  * If inadequate response to lifestyle therapies and ongoing BMI >27 (with comorbid conditions) or BMI > 30, recommend weight loss medication initiation, such as semaglutide or liraglutide  -- Referral to PCP to establish care  -- Colonoscopy for workup of hematochezia; patient prefers to go to [facility]    RTC: PRN; follow up with PCP for management of hepatic steatosis and metabolic syndrome    Jhoana Fernández MD (Lizzie)  Advanced & Transplant Hepatology  [facility]    I spent 60 minutes on this encounter performing the following: reviewing the patient's medical record (clinic visits, hospital records, lab results, imaging and procedural documentation), history taking, physical exam and documentation on the date of the encounter. I also spent part of the time in coordination of care and counseling.    HPI:  The patient presented with his mother.  The patient denied an interest in an .    The patient presented to the emergency room on 3/6/2024 for abdominal pain, decreased appetite and emesis.  At that time he was noted to have fatty changes in his liver and was referred to hepatology.  He reports that at this time his  stools were darker in color.    Since that ER visit the patient reports doing well.  He denies any fevers, chills, nausea, emesis, abdominal pain or diarrhea.  He has been having intermittent bright red blood per rectum for several years but has not previously reported it.  He denies any unintentional weight loss.  He denies any prior abdominal surgeries.  He denies any dysphagia or odynophagia.    The patient has mental health issues that had led to inpatient psychologic rehab in the summer 2023.  Around that time he was started on olanzapine and increased his weight from 140 pounds to 180 pounds.    He has no signs or symptoms of decompensated liver disease.  He has no known family history of liver disease.  He denies any history of alcohol overuse.    Medical hx Surgical hx   Past Medical History:   Diagnosis Date     Bipolar 2 disorder (H)      Depression      Fracture of elbow      Hepatitis     ? in [facility]     Substance use disorder     Meth use      Past Surgical History:   Procedure Laterality Date     none            Medications  Current Outpatient Medications   Medication Sig Dispense Refill     Acetaminophen (TYLENOL PO) Take 500-1,000 mg by mouth every 8 hours as needed       ibuprofen (ADVIL/MOTRIN) 200 MG capsule Take 200 mg by mouth every 4 hours as needed for fever       nicotine (NICODERM CQ) 7 MG/24HR 24 hr patch Place 1 patch onto the skin every 24 hours 10 patch 0     OLANZapine (ZYPREXA) 7.5 MG tablet Take 7.5 mg by mouth daily       omeprazole (PRILOSEC) 40 MG DR capsule Take 1 capsule (40 mg) by mouth daily for 14 days 14 capsule 0     sucralfate (CARAFATE) 1 GM tablet Take 1 tablet (1 g) by mouth 4 times daily for 7 days 28 tablet 0     Cholecalciferol (VITAMIN D3 PO) Take 1,000 Units by mouth Reported on 3/26/2017 (Patient not taking: Reported on 3/12/2024)       Multiple Vitamins-Minerals (CENTRUM PO) Reported on 3/26/2017 (Patient not taking: Reported on 3/12/2024)    "      Allergies  No Known Allergies    Family hx Social hx   Family History   Problem Relation Age of Onset     Hypertension Mother      Cerebrovascular Disease Maternal Grandfather      Hypertension Maternal Grandfather      No known family history of liver disease    Sister with depression.  16-year-old half sibling with OCD and generalized anxiety disorder Social History     Tobacco Use     Smoking status: Every Day     Types: Vaping Device     Smokeless tobacco: Never   Substance Use Topics     Alcohol use: No     Alcohol/week: 0.0 standard drinks of alcohol     Drug use: No       - Lives with his aunt and another individual in [place]  - Alcohol: Denies  - Tobacco: Previously smoked a quarter of a pack per day but now vapes 6% nicotine.  He goes through a 60 milliliters cartridge every 2 weeks.  - Drugs: History of methamphetamine use     Review of systems  A 10-point review of systems was negative.    Examination  BP (!) 137/99 (BP Location: Left arm, Patient Position: Sitting, Cuff Size: Adult Large)   Pulse 71   Temp 98.4  F (36.9  C) (Oral)   Resp 18   Ht 1.676 m (5' 5.98\")   Wt 84.2 kg (185 lb 9.6 oz)   SpO2 96%   BMI 29.97 kg/m     Body mass index is 29.97 kg/m .    Gen-NAD  Eye- EOMI  ENT- MMM  CVS- RRR, no murmurs  RS- CTA bilaterally  Abd-obese, soft, nontender.  Extr- 2+ radial pulses bilaterally, no lower extremity edema bilaterally  MS- hands without clubbing  Neuro- A+Ox3, no asterixis  Skin- no rash or jaundice  Psych- normal mood    Laboratory  BMPRecent Labs   Lab Test 03/12/24  0728 03/06/24  0651 07/14/22  1205    135 136   POTASSIUM 3.6 3.7 3.1*   CHLORIDE 104 99 107   EVA 9.3 9.3 8.7   CO2 23 21* 24   BUN 9.3 10.1 18   CR 0.98 0.94 0.88   * 126* 101*     CBC  Recent Labs   Lab Test 03/12/24  0728 03/06/24  0651 07/14/22  1205 07/14/22  1205 04/03/21  1144   WBC 5.9 8.8  --  9.1 9.7   RBC 5.90 6.14*  --  4.86  --    HGB 16.5 17.3   < > 13.8  --    HCT 48.4 50.7  --  " 39.5*  --    MCV 82 83  --  81  --    MCH 28.0 28.2  --  28.4  --    MCHC 34.1 34.1  --  34.9  --    RDW 12.5 12.9  --  12.3  --     370  --  319  --     < > = values in this interval not displayed.     Liver Enzymes   Recent Labs   Lab Test 03/12/24  0728   PROTTOTAL 8.4*   ALBUMIN 4.9   BILITOTAL 1.1   ALKPHOS 95   AST 43   ALT 95*      INR   INR   Date Value Ref Range Status   03/12/2024 1.03 0.85 - 1.15 Final      Radiology  Fibroscan 3/12/2024  5.9kPa IQR 12%  , IQR 79    Abdominal US 3/6/2024  1.  No calcified gallstone or evidence of cholecystitis.  2.  Hepatic steatosis.  * There is no intrahepatic biliary dilatation. The common duct near the catherine hepatis measures 4 mm.    CT Abdomen Pelvis w/Contrast 3/6/2024  1.  No convincing acute process within the abdomen or pelvis. No bowel obstruction.  2.  Hepatic steatosis.  3.  Common bile duct diameter is at the upper limits of normal, possibly physiologic. No calcified gallstone or filling defect by CT.      Again, thank you for allowing me to participate in the care of your patient.        Sincerely,        Jhoana Fernández MD

## 2024-03-13 LAB
ANA SER QL IF: NEGATIVE
TTG IGA SER-ACNC: 0.6 U/ML
TTG IGG SER-ACNC: <0.6 U/ML

## 2024-03-16 ASSESSMENT — ANXIETY QUESTIONNAIRES
4. TROUBLE RELAXING: NOT AT ALL
IF YOU CHECKED OFF ANY PROBLEMS ON THIS QUESTIONNAIRE, HOW DIFFICULT HAVE THESE PROBLEMS MADE IT FOR YOU TO DO YOUR WORK, TAKE CARE OF THINGS AT HOME, OR GET ALONG WITH OTHER PEOPLE: NOT DIFFICULT AT ALL
GAD7 TOTAL SCORE: 0
6. BECOMING EASILY ANNOYED OR IRRITABLE: NOT AT ALL
1. FEELING NERVOUS, ANXIOUS, OR ON EDGE: NOT AT ALL
5. BEING SO RESTLESS THAT IT IS HARD TO SIT STILL: NOT AT ALL
2. NOT BEING ABLE TO STOP OR CONTROL WORRYING: NOT AT ALL
3. WORRYING TOO MUCH ABOUT DIFFERENT THINGS: NOT AT ALL
7. FEELING AFRAID AS IF SOMETHING AWFUL MIGHT HAPPEN: NOT AT ALL

## 2024-03-16 NOTE — COMMUNITY RESOURCES LIST (PATIENT PREFERRED LANGUAGE)
March 16, 2024           LEANDRAG MICHELLEIZMELITON BELINDA NG MGA SERBISYO at PROGRAM               na [facility] na Transportasyon, (NEMT)      Transportation - Transportation to medical appointments  [address] [place] [address] (Distansya: 3.2 milya)  Telepono: [phone]  Website: [email or web address]  Wika: Donna  Bayad: Self pay, Insurance  Accessibility: May accessible      City Mobility - Transportation to medical appointments  [address] [place] [address] (Distansya: 10.5 milya)  Telepono: [phone]  Website: [email or web address]  Wika: Lala Thompson Somali Bayad: Self pay, Insurance  Accessibility: Mga serbisyo sa [facility]      Social [facility] - Neighbor to Neighbor Program  Telepono: [phone]  Email: [email or web address]  Website: [email or web address]/services/older-adults/-services/neighbor-to-neighbor  Wiyo: Donna  Oras: Lawanda 8:00 AM - 5:00 PM Mar 8:00 AM - 5:00 PM Miy 8:00 AM - 5:00 PM Huw 8:00 AM - 5:00 PM Biy 8:00 AM - 5:00 PM  Bayad: Insurance, Self pay  Accessibility: Bingi o mahirap ang pandinig, Blind accommodation, Translation services     Gastusin sa Transportasyon      Better Society - A Better Society  [address] [place] [address] (Distansya: 12.0 milya)  Website: [email or web address]  Sandrita: Donna      RUNAWAY SAFELINE - RUNAWAY YOUTH CRISIS SERVICES - NATIONAL RUNAWAY SAFELINE  Telepono: [phone]  Website: [email or web address] [place] [email or web address]  Wiyo: Donna      - Dislocated Worker/Adult [employer] Employment Program  Telepono: [phone]  Email: [email or web address]  Website: [email or web address]://LendInvestorg/services/employment-services/[email or web address]-worker-program/  Sandrita: Angelo Thompson  Oras: Lawanda 8:00 AM - 4:30 PM Mar 8:00 AM - 4:30 PM Miy 8:00 AM - 4:30 PM Huw 8:00 AM - 4:30 PM Biy 8:00 AM - 4:30 PM  Felton: Idalia  Accessibility: May accessible    [facility]  Transit Authority (MVTA) - Ride coordination  [address] [place] [address] (Distansya: 9.0 milya)  Website: https://www.mvta.com/  Sandrita: Donna  [facility]: [facility] Mobility - Ride coordination  [address] [place] [address] (Distansya: 10.5 milya)  Website: [email or web address]  Wika: Lala Thompson Somali  Bayad: Self pay, Insurance  Accessibility: [facility] barbie  pagsalin      - Services  Telepono: [phone]  Website: [email or web address]  Oras: Elise 12:00 AM - 11:45 PM Lawanda 12:00 AM - 11:45 PM Mar 12:00 AM - 11:45 PM Miy 12:00 AM - 11:45 PM Huw 12:00 AM - 11:45 PM Biy 12:00 AM - 11:45 PM Sab 12:00 AM - 11:45 PM  Bayad: [facility]  Accessibility: May accessible, Blind accommodation, Bingi o mahina ang pandinig               MAHALAGANG RANDOLPH at WEBSITE        [facility] Kimberlikeli Harpreet-emergency  911  .   Zachary Dafilipe  211 [email or web address]  .   Pagkontrol ng Lason  [phone] http://[email or web address] [email or web address]  .     Pagjoaquiny at [facility] Lifeline  988 http://[email or web address] Health Community Memorial Hospitalline.org  .   [facility] National Child Abuse Hotline  308.476.8231 http://Childhelphotline.org   .   [place] Sexual Assault Hotline  (164) 309-3750 (KENDAL) http://Rainn.org   .     [place] Runaway Safeline  (180) 968-4894 (RUNAWAY) http://1800runaway.org  .   Pagbubuntis at Postpartum Support  Albin/text sa 409-699-7217 [place]: http://ppsupportmn.org [place]: http://psichapters.com/wi  .   [place] Helpline  Pag-abuso sa Substance ([facility])  800-622-HELP (1284) http://Findtreatment.gov   .                DISCLAIMER: Ang [employer]ite  champ goel mga service provider  avivacharleen [facility] afshin owens. Edwadr serinanamendel boyer Unitrobert  na ang mga serbisyong loraineRiver's Edge Hospitalalena oakes magiging available sa iyo o mapapabuti ang iyong kalusugan o kagalingan.    [facility]

## 2024-03-16 NOTE — COMMUNITY RESOURCES LIST (ENGLISH)
March 16, 2024           YOUR PERSONALIZED LIST OF SERVICES & PROGRAMS               Medical Transportation, (NEMT)      Transportation - Transportation to medical appointments  [address] [place] [address] (Distance: 3.2 miles)  Phone: [phone]  Website: https://[employer]  Language: English  Fee: Self pay, Insurance  Accessibility: Ada accessible      City Mobility - Transportation to medical appointments  [address] [address] [place] [address] (Distance: 10.5 miles)  Phone: [phone]  Website: http://Cert[employer].Yecuris/  Language: English, Hebrew, Kyrgyz  Fee: Self pay, Insurance  Accessibility: Translation services      Social Service [facility] - Neighbor to Neighbor Program  Phone: [phone]  Email: [email or web address]  Website: https://www.[facility].org/services/older-adults/-services/neighbor-to-neighbor  Language: English  Hours: Mon 8:00 AM - 5:00 PM Tue 8:00 AM - 5:00 PM Wed 8:00 AM - 5:00 PM Thu 8:00 AM - 5:00 PM Fri 8:00 AM - 5:00 PM  Fee: Insurance, Self pay  Accessibility: Deaf or hard of hearing, Blind accommodation, Translation services    Expense Assistance      Better Society - A Better Society  [address] [place] [address] (Distance: 12.0 miles)  Website: http://[facility].org  Language: English      RUNAWAY SAFELINE - RUNAWAY YOUTH CRISIS SERVICES - NATIONAL RUNAWAY SAFELINE  Phone: [phone]  Website: https://www.[address] [place] [address].Inline.me/  Language: English      - Dislocated Worker/Adult [employer] Employment Program  Phone: [phone]  Email: [email or web address]  Website: https://TinderBox.org/services/employment-services/dislocated-worker-program/  Language: English, Kyrgyz  Hours: Mon 8:00 AM - 4:30 PM Tue 8:00 AM - 4:30 PM Wed 8:00 AM - 4:30 PM Thu 8:00 AM - 4:30 PM Fri 8:00 AM - 4:30 PM  Fee: Free  Accessibility: Ada accessible    [facility] Transit Authority (TA) - Dennise  coordination  [address] [place] [address] (Distance: 9.0 miles)  Website: https://www.mvta.com/  Language: English  Fee: Self pay      City Mobility - Ride coordination  [address] [place] [address] (Distance: 10.5 miles)  Website: http://www.[employer]/  Language: English, Albanian, Icelandic  Fee: Self pay, Insurance  Accessibility: Translation services      - Services  Phone: [phone]  Website: https://[employer] Corporation/#howitworks-section  Hours: Sun 12:00 AM - 11:45 PM Mon 12:00 AM - 11:45 PM Tue 12:00 AM - 11:45 PM Wed 12:00 AM - 11:45 PM Thu 12:00 AM - 11:45 PM Fri 12:00 AM - 11:45 PM Sat 12:00 AM - 11:45 PM  Fee: Self pay  Accessibility: Ada accessible, Blind accommodation, Deaf or hard of hearing               IMPORTANT NUMBERS & WEBSITES        Emergency Services  911  .   [facility]  211 http://211unitedway.org  .   Poison Control  (350) 873-2829 http://mnpoison.org http://wisconsinpoison.org  .     Suicide and Crisis Lifeline  988 http://988lifeline.org  .   Childhelp [place] Child Abuse Hotline  186.423.6475 http://Childhelphotline.org   .   [place] Sexual Assault Hotline  (355) 334-5806 (HOPE) http://Rainn.org   .     National Runaway Safeline  (897) 950-1227 (RUNAWAY) http://1800runa"[employer] [employer]".org  .   Pregnancy & Postpartum Support  Call/text 601-976-7722  [place]: http://ppsupportmn.org  [place]: http://Roadster.com/wi  .   Substance Abuse National Helpline ([facility])  241-473-HELP (8764) http://Findtreatment.gov   .                DISCLAIMER: Unite Us does not endorse any service providers mentioned in this resource list. Unite Us does not guarantee that the services mentioned in this resource list will be available to you or will improve your health or wellness.    [facility]

## 2024-03-19 LAB
A1AT PHENOTYP SERPL-IMP: NORMAL
A1AT SERPL-MCNC: 130 MG/DL
A1AT SS SERPL-MCNC: NEGATIVE G/L
A1AT SZ SERPL-MCNC: NORMAL G/L
A1AT ZZ SERPL-MCNC: NEGATIVE G/L
SPECIMEN SOURCE: NORMAL

## 2024-03-19 NOTE — COMMUNITY RESOURCES LIST (ENGLISH)
March 19, 2024           YOUR PERSONALIZED LIST OF SERVICES & PROGRAMS               Medical Transportation, (NEMT)      City Mobility - Transportation to medical appointments  [address] [place] [address] (Distance: 10.5 miles)  Phone: [phone]  Website: http://www.[place]/  Language: English, Vietnamese, Gabonese  Fee: Self pay, Insurance  Accessibility: Translation services      Transportation - Transportation to medical appointments  [address] [place] [address] (Distance: 3.2 miles)  Phone: [phone]  Website: https://Vaccinogen  Language: English  Fee: Self pay, Insurance  Accessibility: Ada accessible      Social Service [facility] - Neighbor to Neighbor Program  Phone: [phone]  Email: [email or web address]  Website: https://www.[facility].org/services/older-adults/-services/neighbor-to-neighbor  Language: English  Hours: Mon 8:00 AM - 5:00 PM Tue 8:00 AM - 5:00 PM Wed 8:00 AM - 5:00 PM Thu 8:00 AM - 5:00 PM Fri 8:00 AM - 5:00 PM  Fee: Insurance, Self pay  Accessibility: Deaf or hard of hearing, Blind accommodation, Translation services    Expense Assistance      Better Society - A Better Society  [address] [place] [address] (Distance: 12.0 miles)  Website: http://[facility].org  Language: English      - Dislocated Worker/Adult [employer] Employment Program  Phone: [phone]  Email: [email or web address]  Website: https://GoCardlessmn.org/services/employment-services/dislocated-worker-program/  Language: English, Gabonese  Hours: Mon 8:00 AM - 4:30 PM Tue 8:00 AM - 4:30 PM Wed 8:00 AM - 4:30 PM Thu 8:00 AM - 4:30 PM Fri 8:00 AM - 4:30 PM  Fee: Free  Accessibility: Ada accessible      RUNAWAY SAFELINE - RUNAWAY YOUTH CRISIS SERVICES - NATIONAL RUNAWAY SAFELINE  Phone: [phone]  Website: https://www.[facility].org/  Language: English    Coordination      [facility] Mobility - Ride coordination  [address] [place] [address] (Distance: 10.5 miles)  Website: http://www.All4Staff/  Language: English, Luxembourgish, Citizen of [place]  Fee: Self pay, Insurance  Accessibility: Translation services      [place] Transit Authority (MVTA) - Ride coordination  [address] [place] [address] (Distance: 9.0 miles)  Website: https://www.mvta.com/  Language: English  Fee: Self pay      Care - Disability Home Care Services  Phone: [phone]  Website: https://Ruzuku/home-care/types-of-care/disability-services  Hours: Sun 12:00 AM - 11:45 PM Mon 12:00 AM - 11:45 PM Tue 12:00 AM - 11:45 PM Wed 12:00 AM - 11:45 PM Thu 12:00 AM - 11:45 PM Fri 12:00 AM - 11:45 PM Sat 12:00 AM - 11:45 PM  Fee: Insurance, Self pay               IMPORTANT NUMBERS & WEBSITES        Emergency Services  911  .   [facility]  211 http://211unitedway.org  .   Poison Control  (969) 813-9931 http://mnpoison.org http://wisconsinpoison.org  .     Suicide and Crisis Lifeline  988 http://988lifeline.org  .   Childhelp [place] Child Abuse Hotline  189.539.7658 http://Childhelphotline.org   .   National Sexual Assault Hotline  (804) 527-5786 (HOPE) http://Rainn.org   .     National Runaway Safeline  (398) 658-7968 (RUNAWAY) http://1800runaway.org  .   Pregnancy & Postpartum Support  Call/text 238-067-1475  [place]: http://ppsupportmn.org  [place]: http://Scholastica.com/wi  .   Substance Abuse National Helpline ([facility])  068-925-HELP (7349) http://Findtreatment.gov   .                DISCLAIMER: Unitrobert Us does not endorse any service providers mentioned in this resource list. Unite Us does not guarantee that the services mentioned in this resource list will be available to you or will improve your health or wellness.    [facility]

## 2024-03-19 NOTE — COMMUNITY RESOURCES LIST (PATIENT PREFERRED LANGUAGE)
March 19, 2024           LEANDRAG MICHELLEIZADOSHASHI BELINDA  MGA SERBISYO at [facility]               na [facility] na [facility]asyon, (NEMT)      City Mobility - Transportation to medical appointments  [address] [place] [address] (Distansya: 10.5 milya)  Telepono: [phone]  Website: http://www.Nuevora/  Wika: Lala Thomposn Somali  [place]: Self pay, Insurance  Accessibility: Mga serbisyo sa pagsalin      Transportation - Transportation to medical appointments  [address] [place] [address] (Distansya: 3.2 milya)  Telepono: [phone]  Website: https://McGinley [employer]ations  Wika: Donna Ya: Self pay, Insurance  Accessibility: May accessible      Social Service [facility] - Neighbor to Neighbor Program  Telepono: [phone]  Email: [email or web address]  Website: https://www.[facility].org/services/older-adults/-services/neighbor-to-neighbor  Wika: Donna  Oras: Lawanda 8:00 AM - 5:00 PM Mar 8:00 AM - 5:00 PM Miy 8:00 AM - 5:00 PM Huw 8:00 AM - 5:00 PM Biy 8:00 AM - 5:00 PM  Bayad: Insurance, Self pay  Accessibility: Bingi o mahirap ang pandinig, Blind accommodation, Translation services    sa Gastos sa Transportasyon      Better Society - A Better Society  [address] [place] [address] (Distansya: 12.0 milya)  Website: http://[facility].org  Wika: Donna      - Dislocated Worker/Adult W[employer] Employment Program  Telepono: [phone]  Email: [email or web address]  Website: https://IDEV Technologiesmn.org/services/employment-services/dislocated-worker-program/  Wika: Angelo Thompson  Oras: Lawanda 8:00 AM - 4:30 PM Mar 8:00 AM - 4:30 PM Miy 8:00 AM - 4:30 PM Huw 8:00 AM - 4:30 PM Biy 8:00 AM - 4:30 PM  [place]ton: Idalia  Accessibility: May accessible      RUNAWAY SAFELINE - RUNAWAY YOUTH CRISIS SERVICES - [facility] RUNAWAY SAFELINE  Telepono: [phone]  Website: https://www.[address] [place] [address].org/  Sandrita: Donna nugent [facility]  - Ride coordination  [address] [place] [address] (Distansya: 10.5 milya)  Website: http://www.[employer] Vital Records/  Sandrita: Lala Thompson Somali  Bayad: Self pay, Insurance  Accessibility: Mga serbisyo sa [facility] ([facility]) - Ride coordination  [address] [place] [address] (Distansya: 9.0 milya)  Website: https://www.mvta.com/  Sandrita: Donna  Bayad: Shahana bayad      Care - Disability Home Care Services  Telepono: [phone]  Website: https://www.RentJuice/homeMomperycare/types-of-care/disability-services  Oras: Elise 12:00 AM - 11:45 PM Lawanda 12:00 AM - 11:45 PM Mar 12:00 AM - 11:45 PM Miy 12:00 AM - 11:45 PM Huw 12:00 AM - 11:45 PM Biy 12:00 AM - 11:45 PM Sab 12:00 AM - 11:45 PM  Bayad: Insurance, Self pay               MAHALAGANG RANDOLPH at WEBSITE        [facility] KimberliRavenden Harpreet-emergency  911  .   Zachary Dafilipe  211 http://211unitedway.org  .   Pagkontrol ng [facility]  ([phone] [phone] http://mnpoison.org http://wisconsinpoison.org  .     Pagpapakamatay at [facility]  988 http://988[facility] Centerline.org  .   Childhelp National Child Abuse Hotline  287.461.9128 http://Childhelphotline.org   .   [place] Sexual Assault Hotline  (333) 688-3014 (KENDAL) http://Rainn.org   .     [place] Runaway Safeline  (485) 842-7754 (RUNAWAY) http://1800runaway.org  .   Pagbubuntis at Postpartum Support  Albin/text sa 864-605-4524 [place]: http://ppsupportmn.org [place]: http://psichapters.com/wi  .   [place] Helpline sa Pag-abuso sa Substance ([facility])  800-622-HELP (2813) http://Findtreatment.gov   .                DISCLAIMER: Ang Unite  champ goel mgshara service provider  ania [facility] afshin owens. Edward boyer Unite  na ang mga serbisyong l[facility]aineM[facility] County Hospital afshin hernandes o cr villatoro.    [facility]

## 2024-03-21 ENCOUNTER — OFFICE VISIT (OUTPATIENT)
Dept: FAMILY MEDICINE | Facility: CLINIC | Age: 34
End: 2024-03-21
Attending: INTERNAL MEDICINE
Payer: COMMERCIAL

## 2024-03-21 VITALS
HEART RATE: 84 BPM | RESPIRATION RATE: 16 BRPM | BODY MASS INDEX: 29.65 KG/M2 | WEIGHT: 184.5 LBS | OXYGEN SATURATION: 98 % | SYSTOLIC BLOOD PRESSURE: 136 MMHG | HEIGHT: 66 IN | TEMPERATURE: 97.7 F | DIASTOLIC BLOOD PRESSURE: 80 MMHG

## 2024-03-21 DIAGNOSIS — Z13.21 ENCOUNTER FOR VITAMIN DEFICIENCY SCREENING: ICD-10-CM

## 2024-03-21 DIAGNOSIS — R03.0 ELEVATED BLOOD PRESSURE READING WITHOUT DIAGNOSIS OF HYPERTENSION: ICD-10-CM

## 2024-03-21 DIAGNOSIS — R79.89 ELEVATED LFTS: ICD-10-CM

## 2024-03-21 DIAGNOSIS — K76.0 HEPATIC STEATOSIS: Primary | ICD-10-CM

## 2024-03-21 DIAGNOSIS — R73.03 PREDIABETES: ICD-10-CM

## 2024-03-21 DIAGNOSIS — F15.21 METHAMPHETAMINE USE DISORDER, SEVERE, IN SUSTAINED REMISSION (H): ICD-10-CM

## 2024-03-21 DIAGNOSIS — Z11.4 SCREENING FOR HIV (HUMAN IMMUNODEFICIENCY VIRUS): ICD-10-CM

## 2024-03-21 DIAGNOSIS — Z87.891 PERSONAL HISTORY OF TOBACCO USE, PRESENTING HAZARDS TO HEALTH: ICD-10-CM

## 2024-03-21 DIAGNOSIS — K92.1 HEMATOCHEZIA: ICD-10-CM

## 2024-03-21 DIAGNOSIS — F31.78 BIPOLAR DISORDER, IN FULL REMISSION, MOST RECENT EPISODE MIXED (H): ICD-10-CM

## 2024-03-21 PROCEDURE — 99417 PROLNG OP E/M EACH 15 MIN: CPT | Performed by: PHYSICIAN ASSISTANT

## 2024-03-21 PROCEDURE — 99215 OFFICE O/P EST HI 40 MIN: CPT | Mod: 25 | Performed by: PHYSICIAN ASSISTANT

## 2024-03-21 PROCEDURE — 90471 IMMUNIZATION ADMIN: CPT | Performed by: PHYSICIAN ASSISTANT

## 2024-03-21 PROCEDURE — 90686 IIV4 VACC NO PRSV 0.5 ML IM: CPT | Performed by: PHYSICIAN ASSISTANT

## 2024-03-21 RX ORDER — OLANZAPINE 7.5 MG/1
7.5 TABLET, FILM COATED ORAL DAILY
Qty: 90 TABLET | Refills: 0 | Status: SHIPPED | OUTPATIENT
Start: 2024-03-21 | End: 2024-06-20

## 2024-03-21 ASSESSMENT — ANXIETY QUESTIONNAIRES
7. FEELING AFRAID AS IF SOMETHING AWFUL MIGHT HAPPEN: NOT AT ALL
4. TROUBLE RELAXING: NOT AT ALL
5. BEING SO RESTLESS THAT IT IS HARD TO SIT STILL: NOT AT ALL
6. BECOMING EASILY ANNOYED OR IRRITABLE: NOT AT ALL
3. WORRYING TOO MUCH ABOUT DIFFERENT THINGS: NOT AT ALL
2. NOT BEING ABLE TO STOP OR CONTROL WORRYING: NOT AT ALL
GAD7 TOTAL SCORE: 0
1. FEELING NERVOUS, ANXIOUS, OR ON EDGE: NOT AT ALL
7. FEELING AFRAID AS IF SOMETHING AWFUL MIGHT HAPPEN: NOT AT ALL
8. IF YOU CHECKED OFF ANY PROBLEMS, HOW DIFFICULT HAVE THESE MADE IT FOR YOU TO DO YOUR WORK, TAKE CARE OF THINGS AT HOME, OR GET ALONG WITH OTHER PEOPLE?: NOT DIFFICULT AT ALL
GAD7 TOTAL SCORE: 0
GAD7 TOTAL SCORE: 0
IF YOU CHECKED OFF ANY PROBLEMS ON THIS QUESTIONNAIRE, HOW DIFFICULT HAVE THESE PROBLEMS MADE IT FOR YOU TO DO YOUR WORK, TAKE CARE OF THINGS AT HOME, OR GET ALONG WITH OTHER PEOPLE: NOT DIFFICULT AT ALL

## 2024-03-21 NOTE — COMMUNITY RESOURCES LIST (ENGLISH)
March 21, 2024           YOUR PERSONALIZED LIST OF SERVICES & PROGRAMS               Medical Transportation, (NEMT)      City Mobility - Transportation to medical appointments  [address] [place] [address] (Distance: 10.5 miles)  Phone: [phone]  Website: http://www.PetSitnStay/  Language: English, Pashto, Portuguese  Fee: Self pay, Insurance  Accessibility: Translation services      Transportation - Transportation to medical appointments  [address] [place] [address] (Distance: 3.2 miles)  Phone: [phone]  Website: https://[employer]  Language: English  Fee: Self pay, Insurance  Accessibility: Ada accessible      Social Service [facility] - Neighbor to Neighbor Program  Phone: [phone]  Email: [email or web address]  Website: https://www.[facility].org/services/older-adults/-services/neighbor-to-neighbor  Language: English  Hours: Mon 8:00 AM - 5:00 PM Tue 8:00 AM - 5:00 PM Wed 8:00 AM - 5:00 PM Thu 8:00 AM - 5:00 PM Fri 8:00 AM - 5:00 PM  Fee: Insurance, Self pay  Accessibility: Deaf or hard of hearing, Blind accommodation, Translation services    Expense Assistance      Better Society - A Better Society  [address] [place] [address] (Distance: 12.0 miles)  Website: http://[facility].org  Language: English      WALTER NETWORK - FLIGHTS FOR CANCER/BONE MARROW/STEM CELL PATIENTS AND RECIPIENTS  Phone: [phone]  Email: info@[employer] Inbox.org  Website: http://Unified Inbox.org/home  Language: English      - Dislocated Worker/Adult W[employer] Employment Program  Phone: [phone]  Email: [email or web address]  Website: https://[employer].org/services/employment-services/dislocated-worker-program/  Language: English, Portuguese  Hours: Mon 8:00 AM - 4:30 PM Tue 8:00 AM - 4:30 PM Wed 8:00 AM - 4:30 PM Thu 8:00 AM - 4:30 PM Fri 8:00 AM - 4:30 PM  Fee: Free  Accessibility: Ada  accessible    [facility] Mobility - Ride coordination  [address] [place] [address] (Distance: 10.5 miles)  Website: http://www.Mingly/  Language: English, Greenlandic, Monegasque  Fee: Self pay, Insurance  Accessibility: Translation services      [place] Transit Authority (TA) - Ride coordination  [address] [place] [address] (Distance: 9.0 miles)  Website: https://www.mvta.com/  Language: English  Fee: Self pay      A Mile - Flight Purchasing  Phone: [phone]  Website: https://S[employer]a [employer]ochem.SiteMinder/about-us/  Language: English  Fee: Free, Self pay               IMPORTANT NUMBERS & WEBSITES        Emergency Services  911  .   [facility]  211 http://211unitedway.org  .   Poison Control  (380) 163-4764 http://mnpoison.org http://wisconsinpoison.org  .     Suicide and Crisis Lifeline  988 http://988lifeline.org  .   Childhelp [place] Child Abuse Hotline  891.378.5835 http://Childhelphotline.org   .   National Sexual Assault Hotline  (890) 270-8394 (HOPE) http://Renewable Fundingn.org   .     National Runaway Safeline  (679) 932-8546 (RUNAWAY) http://1800runaCopyRightNow.org  .   Pregnancy & Postpartum Support  Call/text 199-462-1989  [place]: http://ppsupportmn.org  [place]: http://CONEXANCE MD.com/wi  .   Substance Abuse National Helpline ([facility])  858-756-HELP (4514) http://Findtreatment.gov   .                DISCLAIMER: Unite Us does not endorse any service providers mentioned in this resource list. Unite Us does not guarantee that the services mentioned in this resource list will be available to you or will improve your health or wellness.    [facility]

## 2024-03-21 NOTE — COMMUNITY RESOURCES LIST (PATIENT PREFERRED LANGUAGE)
March 21, 2024           LEANDRAG MICHELLEIZADONG BELINDA NG MGA SERBISYO at [facility] Transportasyon, (NEMT)      [facility] Mobility - Transportation to medical appointments  [address] [place] [address] (Distansya: 10.5 milya)  Telepono: [phone]  Website: http://www.Knova Software/  Wika: Lala Thompson Somali  [place]: Self pay, Insurance  Accessibility: Mga serbisyo sa pagsalin      Transportation - Transportation to medical appointments  [address] [place] [address] (Distansya: 3.2 milya)  Telepono: [phone]  Website: https://DPSI  Wika: Donna Ya: Self pay, Insurance  Accessibility: May accessible      Social Service [facility] - Neighbor to Neighbor Program  Telepono: [phone]  Email: [email or web address]  Website: https://www.[facility].org/services/older-adults/-services/neighbor-to-neighbor  Wika: Donna  Oras: Lawanda 8:00 AM - 5:00 PM Mar 8:00 AM - 5:00 PM Miy 8:00 AM - 5:00 PM Huw 8:00 AM - 5:00 PM Biy 8:00 AM - 5:00 PM  Bayad: Insurance, Self pay  Accessibility: Bingi o mahirap ang pandinig, Blind accommodation, Translation services    sa Gastos sa Transportasyon      Better Society - A Better Society  [address] [place] [address] (Distansya: 12.0 milya)  Website: http://abettersLehigh Valley Hospital - Schuylkill East Norwegian Streetety.org  Wika: Donna [employer]SON NETWORK - FLIGHTS FOR CANCER/BONE MARROW/STEM CELL PATIENTS AND RECIPIENTS  Telepono: [phone]  Email: info@ClinkleangePonoMusicetwork.org  Website: http://Pulse Entertainment.org/home  Wika: Donna      - Dislocated Worker/Adult WIOA Employment Program  Telepono: [phone]  Email: [email or web address]  Website: https://FrameBuzz.org/services/employment-services/dislocated-worker-program/  Sandrita: Angelo Thompson  Oras: Lawanda 8:00 AM - 4:30 PM Mar 8:00 AM - 4:30 PM Miy 8:00 AM - 4:30 PM Huw 8:00 AM - 4:30 PM Biy 8:00 AM - 4:30 PM  Felton: Idalia  Accessibility: May  accessible    [facility] Mobility - Ride coordination  [address] [place] [address] (Distansya: 10.5 milya)  Website: http://www.OZON.ru.[employer]/  Wiyo: Lala Thompson Somali Bayad: Self pay, Insurance  Accessibility: [facility] serbisyo [facility] Transit Authority (TA) - Ride coordination  [address] [place] [address] (Distansya: 9.0 milya)  Website: https://www.mvta.com/  Wika: Donna Ya: Sariling bayad      [employer] Mile - Flight Purchasing  Telepono: [phone]  Website: https://Cluepedia.org/about-us/  Wiyo: Donna Ya: Idalia, Self pay               MAHALAGANG RANDOLPH at WEBSITE        [facility] Harpreet-emergency  911  .   Zachary Daan  211 http://211unZapper.org  .   Pagkontrol ng [place]  [phone] [phone] http://mnpoison.org http://wisconsinpoison.org  .     Pagpapakamatay at MLW Squared  988 http://988Peopleclick Authoria.org  .   Childhelp [place] Child Abuse Hotline  722.333.7907 http://Childhelphotline.org   .   [facility] Sexual Assault Hotline  (807) 876-7328 (KENDAL) http://Rainn.org   .     [facility] Runaway Safeline  (731) 987-3850 (RUNAWAY) http://[facility].org  .   Pagbubuntis at Postpartum Support  [place]/text sa 655-215-1449 [place]: http://ppsupportmn.org [place]: http://Wanderable.com/wi  .   Pambansang Helpline sa Pag-abuso sa Substance ([facility])  754-427-HELP (4208) http://Findtreatment.gov   .                DISCLAIMER: Ang Unite Us ay erin nag-eendorso ng anumang [facility] service provider amber owens. Erin smith  Unite  na ang a john jorge [facility] afshin hernandes o cr johnson o irving.    [facility]

## 2024-03-21 NOTE — PROGRESS NOTES
Preventive Care Visit  [facility] [place]  Deisy Gita Jones PA-C, Family Medicine  Mar 21, 2024  {Provider  Link to SmartSet :730687}    {PROVIDER CHARTING PREFERENCE:013090}    Tobin Brown is a 34 year old, presenting for the following:  Physical        3/21/2024    10:43 AM   Additional Questions   Roomed by Claudia JUAREZ        Health Care Directive  Patient does not have a Health Care Directive or Living Will: Discussed advance care planning with patient; however, patient declined at this time.    History of Present Illness       Mental Health Follow-up:  Patient presents to follow-up on Anxiety.    Patient's anxiety since last visit has been:  Better  The patient is not having other symptoms associated with anxiety.  Any significant life events: job concerns  Patient is not feeling anxious or having panic attacks.  Patient has concerns about alcohol or drug use.    Hyperlipidemia:  He presents for follow up of hyperlipidemia.   He is not taking medication to lower cholesterol. He is not having myalgia or other side effects to statin medications.    Hypertension: He presents for follow up of hypertension.  He does not check blood pressure  regularly outside of the clinic. Outside blood pressures have been over 140/90. He does not follow a low salt diet.     He eats 0-1 servings of fruits and vegetables daily.He consumes 2 sweetened beverage(s) daily.He exercises with enough effort to increase his heart rate 9 or less minutes per day.  He exercises with enough effort to increase his heart rate 3 or less days per week.   He is taking medications regularly.      {MA/LPN/RN Pre-Provider Visit Orders- hCG/UA/Strep (Optional):433934}  Anxiety   How are you doing with your anxiety since your last visit? { :147843}  Are you having other symptoms that might be associated with anxiety? { :549614}  Have you had a significant life event? { :541828}   Are you feeling depressed? { :844201}  Do you have  any concerns with your use of alcohol or other drugs? { :453644}    Social History     Tobacco Use    Smoking status: Every Day     Types: Vaping Device    Smokeless tobacco: Never   Substance Use Topics    Alcohol use: No     Alcohol/week: 0.0 standard drinks of alcohol    Drug use: No         3/16/2024     2:04 PM 3/21/2024     8:11 AM   SHAYNA-7 SCORE   Total Score  0 (minimal anxiety)   Total Score 0 0          No data to display                   No data to display                  3/21/2024     8:11 AM   SHAYNA-7    1. Feeling nervous, anxious, or on edge 0   2. Not being able to stop or control worrying 0   3. Worrying too much about different things 0   4. Trouble relaxing 0   5. Being so restless that it is hard to sit still 0   6. Becoming easily annoyed or irritable 0   7. Feeling afraid, as if something awful might happen 0   SHAYNA-7 Total Score 0   If you checked any problems, how difficult have they made it for you to do your work, take care of things at home, or get along with other people? Not difficult at all     {additonal problems for provider to add (Optional):206463}       No data to display                   No data to display                   No data to display                  3/21/2024   Social Factors   Worry food won't last until get money to buy more No   Food not last or not have enough money for food? No   Do you have housing?  Yes   Are you worried about losing your housing? No   Lack of transportation? Yes   Unable to get utilities (heat,electricity)? No    (!) TRANSPORTATION CONCERN PRESENT       No data to display                     Today's PHQ-2 Score:       3/21/2024     8:11 AM   PHQ-2 ( 1999 [employer])   Q1: Little interest or pleasure in doing things 0   Q2: Feeling down, depressed or hopeless 0   PHQ-2 Score 0   Q1: Little interest or pleasure in doing things Not at all   Q2: Feeling down, depressed or hopeless Not at all   PHQ-2 Score 0           3/16/2024   Substance Use   If I could  "quit smoking, I would Completely agree   I want to quit somking, worry about health affects Completely agree   Willing to make a plan to quit smoking Completely agree   Willing to cut down before quitting Completely agree     Social History     Tobacco Use    Smoking status: Every Day     Types: Vaping Device    Smokeless tobacco: Never   Substance Use Topics    Alcohol use: No     Alcohol/week: 0.0 standard drinks of alcohol    Drug use: No     {Provider  If there are gaps in the social history shown above, please follow the link to update and then refresh the note Link to Social and Substance History :514261}         No data to display              {Provider  Use the storyboard to review patient history, after sections have been marked as reviewed, refresh note to capture documentation:871229}   Reviewed and updated as needed this visit by Provider                    {HISTORY OPTIONS (Optional):378860}    {ROS Picklists (Optional):452993}     Objective    Exam  /80   Pulse 84   Temp 97.7  F (36.5  C) (Tympanic)   Resp 16   Ht 1.676 m (5' 5.98\")   Wt 83.7 kg (184 lb 8 oz)   SpO2 98%   BMI 29.80 kg/m     Estimated body mass index is 29.8 kg/m  as calculated from the following:    Height as of this encounter: 1.676 m (5' 5.98\").    Weight as of this encounter: 83.7 kg (184 lb 8 oz).    Physical Exam  {Exam Choices (Optional):321580}        Signed Electronically by: Deisy Jones PA-C  {Email feedback regarding this note to [email or web address]   :534537}  "

## 2024-03-21 NOTE — PROGRESS NOTES
Assessment & Plan     Bipolar disorder, in full remission, most recent episode mixed (H24) - Dr. Reyes Camp  Methamphetamine use disorder, severe, in sustained remission (H) -smoking no IVDU -sober since approximately April 2023.  Was in treatment most recently at [facility] for a total of 3 months.  Patient reports overall good control of his symptoms with Zyprexa.  I advised patient that he has a longstanding history of substance abuse and bipolar disorder that is still fairly early in his diagnosis and treatment/remission.  I will give him a 3-month prescription but want him to continue his relationship with psychiatry, Dr. Camp.  Patient voiced understanding and agreement.  - OLANZapine (ZYPREXA) 7.5 MG tablet  Dispense: 90 tablet; Refill: 0    Hepatic steatosis  Elevated LFTs  Prediabetes  Diet and exercise efforts encouraged.  Patient is already decreased his rice intake as this was a large part of his diet.  Is not exercising on a regular basis but willing to do so.  Goal of 15 to 20 pounds of weight loss.  Patient will work on these efforts and follow-up with me in 3 months    Hematochezia  Colonoscopy recommended.  Referral placed today.  - Adult GI  Referral - Procedure Only    Personal history of tobacco use, presenting hazards to health  Patient using nicotine patches and tapering.  Encouraged continuation.    Elevated blood pressure reading without diagnosis of hypertension  Normotensive in the clinic today.  Patient reports that he has a arm home blood pressure cuff from his mother.  Encouraged that he keep a blood pressure diary/log so that we can review this at his next follow-up appointment.  Avoiding refined sugars/carbohydrates is recommended.  DASH diet recommendations given to patient.    Screening for HIV (human immunodeficiency virus)  Routine screening  - HIV Antigen Antibody Combo    Encounter for vitamin deficiency screening  Patient wondering if he should take  "any vitamins or supplements.  Will draw labs and keep patient apprised of results and if supplementation needed.  - Vitamin B12  - Vitamin D Deficiency  - Folate  - Vitamin B1 whole blood  - Magnesium      Review of prior external note(s) [facility]  61 minutes spent by me on the date of the encounter doing chart review, history and exam, documentation and further activities per the note      Nicotine/Tobacco Cessation  He reports that he has been smoking vaping device. He started smoking about 21 years ago. He has never used smokeless tobacco.  Nicotine/Tobacco Cessation Plan  Pharmacotherapies : Nicotine patch      BMI  Estimated body mass index is 29.8 kg/m  as calculated from the following:    Height as of this encounter: 1.676 m (5' 5.98\").    Weight as of this encounter: 83.7 kg (184 lb 8 oz).   Weight management plan: Discussed healthy diet and exercise guidelines      No follow-ups on file.      Tobin Brown is a 34 year old, presenting for the following health issues:  Establish Care        3/21/2024    10:47 AM   Additional Questions   Roomed by Claudia JUAREZ   Accompanied by Self     History of Present Illness       Mental Health Follow-up:  Patient presents to follow-up on Anxiety.    Patient's anxiety since last visit has been:  Better  The patient is not having other symptoms associated with anxiety.  Any significant life events: job concerns  Patient is not feeling anxious or having panic attacks.  Patient has concerns about alcohol or drug use.    Hyperlipidemia:  He presents for follow up of hyperlipidemia.   He is not taking medication to lower cholesterol. He is not having myalgia or other side effects to statin medications.    Hypertension: He presents for follow up of hypertension.  He does not check blood pressure  regularly outside of the clinic. Outside blood pressures have been over 140/90. He does not follow a low salt diet.     He eats 0-1 " servings of fruits and vegetables daily.He consumes 2 sweetened beverage(s) daily.He exercises with enough effort to increase his heart rate 9 or less minutes per day.  He exercises with enough effort to increase his heart rate 3 or less days per week.   He is taking medications regularly.       Transaminitis/fatty liver  Kevin was evaluated in the emergency room on 3/6/2024 for abdominal pain, decreased appetite and emesis.  At that time he was noted to have fatty changes in his liver and was referred to hepatology.  He reports that at this time his stools were darker in color.     Since that ER visit the patient reports doing well.  He denies any fevers, chills, nausea, emesis, abdominal pain or diarrhea.  He has been having intermittent bright red blood per rectum for several years but has not previously reported it.  He denies any unintentional weight loss.  He denies any prior abdominal surgeries.  He denies any dysphagia or odynophagia.    Saw GI 3/12/2024 at which time he had no signs or symptoms of decompensated liver disease.  He has no known family history of liver disease.  He denies any history of alcohol overuse.    Patient presented in March 2024 with abnormal liver studies that are hepatocellular in nature.  INR within normal limits which argues against synthetic dysfunction.  Patient with normal platelets and  which argues against portal hypertension.  FibroScan performed in clinic today without any evidence of advanced fibrosis.  The patient's AST has normalized and the patient's ALT remains mildly elevated. He does not have hepatitis B, his thyroid studies are normal and he has no evidence of iron overload.     The patient has multiple metabolic risk factors including obesity, prediabetes and hyperlipidemia.  We spent the majority of the visit discussing lifestyle and dietary modifications to promote healthy weight loss which would benefit all of his metabolic comorbid conditions.  He would also  benefit from establishing care with a primary care doctor to facilitate this.      Abdominal US 3/6/2024  1.  No calcified gallstone or evidence of cholecystitis.  2.  Hepatic steatosis.  * There is no intrahepatic biliary dilatation. The common duct near the catherine hepatis measures 4 mm.     CT Abdomen Pelvis w/Contrast 3/6/2024  1.  No convincing acute process within the abdomen or pelvis. No bowel obstruction.  2.  Hepatic steatosis.  3.  Common bile duct diameter is at the upper limits of normal, possibly physiologic. No calcified gallstone or filling defect by CT.  GI recommendations:  -- Follow-up FORTINO, alpha-1 antitrypsin, hepatitis B, hepatitis C and celiac testing  -- No contraindication from a hepatology perspective to prescribe a statin  -- Lifestyle and dietary changes with the goal of 7-10% weight loss               * Limit portion sizes and exclude MASLD promoting components: carbohydrates, sugars, processed food, foods & beverages high in added fructose               * Recommend aerobic exercise and resistance training at least three days a week with gradual increase over time  * If inadequate response to lifestyle therapies and ongoing BMI >27 (with comorbid conditions) or BMI > 30, recommend weight loss medication initiation, such as semaglutide or liraglutide    Follow-up with GI as needed-care transferred to PCP.    Methamphetamine use disorder/bipolar  The patient has mental health and substance use issues that had led to inpatient treatment program for 3 months at [facility].  He reports a history of intermittent methamphetamine use since the age of 15 in the [facility].  Follows with psychiatry, Dr. Camp, with [facility].  Feels that he is doing quite well on Zyprexa.  Has no cravings and is not working an active program at this time.  Wondering if I is a PCP and willing to take over his psychiatric medications.  He has 5 days remaining of his medications.     He has no signs or  "symptoms of decompensated liver disease.  He has no known family history of liver disease.  He denies any history of alcohol overuse.    It appears that Kevin was given a prescription for benztropine for tremor but denies ever trying this medication.    Hematochezia  Patient with intermittent hematochezia for several years.  No other red flag symptoms.  No family history of colon cancer.  Most likely etiology is hemorrhoids however he would benefit from a colonoscopy for further evaluation.       Tobacco  Patient was previously using a quarter of a pack of cigarettes per day and switched to vaping 6% and using 60 mL of tobacco every 2 weeks in mid 2023.  The patient is interested in cutting down.  We discussed modes for nicotine cessation and nicotine replacement therapy.  Patient is willing and interested in a nicotine patch. 5 minutes was spent on counseling the patient on tobacco cessation and providing education          Review of Systems  Constitutional, HEENT, cardiovascular, pulmonary, GI, , musculoskeletal, neuro, skin, endocrine and psych systems are negative, except as otherwise noted.      Objective    /80   Pulse 84   Temp 97.7  F (36.5  C) (Tympanic)   Resp 16   Ht 1.676 m (5' 5.98\")   Wt 83.7 kg (184 lb 8 oz)   SpO2 98%   BMI 29.80 kg/m    Body mass index is 29.8 kg/m .  Physical Exam   GENERAL: alert and no distress  EYES: Eyes grossly normal to inspection, PERRL and conjunctivae and sclerae normal  RESP: lungs clear to auscultation - no rales, rhonchi or wheezes  CV: regular rate and rhythm, normal S1 S2, no S3 or S4, no murmur, click or rub, no peripheral edema  ABDOMEN: soft, nontender, no hepatosplenomegaly, no masses and bowel sounds normal  MS: no gross musculoskeletal defects noted, no edema  SKIN: no suspicious lesions or rashes  NEURO: Normal strength and tone, mentation intact and speech normal, resting head tremor appreciated  PSYCH: mentation appears normal, affect " normal/bright    No results found for any visits on 03/21/24.      Recent Results (from the past 744 hour(s))   CT Abdomen Pelvis w Contrast    Narrative    CT ABDOMEN/PELVIS WITH CONTRAST March 6, 2024 7:40 AM    CLINICAL HISTORY: PO intolerance for three days.    TECHNIQUE: CT scan of the abdomen and pelvis was performed following  injection of IV contrast. Multiplanar reformats were obtained. Dose  reduction techniques were used.  CONTRAST: 95mL Isovue-370.    COMPARISON: None.    FINDINGS:   LOWER CHEST: No infiltrates or effusions.    HEPATOBILIARY: Hepatic steatosis. No suspicious liver lesion. No  calcified gallstone. Common bile duct measures 6 mm. No filling defect  by CT.    PANCREAS: No significant mass, duct dilatation, or inflammatory  change.    SPLEEN: Normal size.    ADRENAL GLANDS: No significant nodules.    KIDNEYS/BLADDER: No significant mass, stones, or hydronephrosis.    BOWEL: No obstruction or inflammatory change. Appendix is borderline  dilated, fluid-filled and without periappendiceal inflammation, likely  within normal limits.    PELVIC ORGANS: Unremarkable.     ADDITIONAL FINDINGS: No ascites. No enlarged lymph nodes.  Nonaneurysmal abdominal aorta.    MUSCULOSKELETAL: No aggressive osseous lesion.      Impression    IMPRESSION:   1.  No convincing acute process within the abdomen or pelvis. No bowel  obstruction.  2.  Hepatic steatosis.  3.  Common bile duct diameter is at the upper limits of normal,  possibly physiologic. No calcified gallstone or filling defect by CT.    MARTA HILL MD         SYSTEM ID:  [id number]   US Abdomen Limited    Narrative    ULTRASOUND ABDOMEN LIMITED  3/6/2024 9:02 AM    CLINICAL HISTORY: Common bile duct at upper limits of normal. Evaluate  for any cholecystitis.    TECHNIQUE: Limited abdominal ultrasound.    COMPARISON: CT abdomen and pelvis 3/6/2024.    FINDINGS:  GALLBLADDER: The gallbladder is normal. No gallstones, wall  thickening, or  pericholecystic fluid. Negative sonographic Bradford's  sign.    BILE DUCTS: There is no intrahepatic biliary dilatation. The common  duct near the catherine hepatis measures 4 mm.    LIVER: Diffusely echogenic with areas of focal fatty sparing. No  suspicious mass.    RIGHT KIDNEY: No hydronephrosis.    PANCREAS: The visualized portions of the pancreas are normal.    No ascites.      Impression    IMPRESSION:  1.  No calcified gallstone or evidence of cholecystitis.  2.  Hepatic steatosis.    MARTA HILL MD         SYSTEM ID:  [id number]   Fibrosis Scan    Impression    Patient: Kevin Rivas  YOB: 1990  Medical Record Number: [id number]    ORDERING PROVIDER: Dr. Jhoana Fernández     EXAMINATION:     Liver FibroScan     DATE OF EXAM:   3/12/24    INDICATION:     Liver fibrosis assessment     HISTORY:     IVY/NAFLD     A series of at least 10 Vibration Controlled Transient Elastography (VCTE) measurements were performed by placing the XL probe over the center of the liver parenchyma and mechanically inducing a 50 Hertz shear wave.     Each resulting VCTE measurement was analyzed to determine shear wave propagation speed and calculate the equivalent liver stiffness.     All measurements were reviewed by the  and physician for technical accuracy. Data variability across the acquired measurements was quantified with IQR/Median Percentage.     FINDINGS:     The median liver stiffness was 5.9 kPa with IQR/Median percentage of 12 %.     The measure CAP ultrasound attenuation rate value was 367 dB/m.     IMPRESSION:       1. Estimated liver fibrosis is Stage 0-1  2. Steatosis Grade S3    The results of the FibroScan examination were assessed by taking into account the quality of the measurement thumbnails, number of measurements, and IQR/Median ratio. I have personally reviewed the examination and initial interpretation, and I agree with the findings.      Juanito Martin PA-C          Signed  Electronically by: Deisy Jones PA-C

## 2024-04-08 ENCOUNTER — TELEPHONE (OUTPATIENT)
Dept: GASTROENTEROLOGY | Facility: CLINIC | Age: 34
End: 2024-04-08
Payer: COMMERCIAL

## 2024-04-08 DIAGNOSIS — K92.1 HEMATOCHEZIA: Primary | ICD-10-CM

## 2024-04-08 NOTE — TELEPHONE ENCOUNTER
"Endoscopy Scheduling Screen    Have you had a positive Covid test in the last 14 days?  No    What is your communication preference for Instructions and/or Bowel Prep?   MyChart    What insurance is in the chart?  Other:  [facility]    Ordering/Referring Provider:   FAYE MYERS        (If ordering provider performs procedure, schedule with ordering provider unless otherwise instructed. )    BMI: Estimated body mass index is 29.8 kg/m  as calculated from the following:    Height as of 3/21/24: 1.676 m (5' 5.98\").    Weight as of 3/21/24: 83.7 kg (184 lb 8 oz).     Sedation Ordered  MAC/deep sedation.   BMI<= 45 45 < BMI <= 48 48 < BMI < = 50  BMI > 50   No Restrictions No [facility]  No [facility]  [facility] with exceptions Hospital Only OR Only       Do you have a history of malignant hyperthermia?  No    Have you been diagnosed or told you have pulmonary hypertension?   No    Do you have an LVAD?  No    Have you been told you have moderate to severe sleep apnea?  No    Have you been told you have COPD, asthma, or any other lung disease?  No    Do you have any heart conditions?  No     Have you ever had or are you waiting for an organ transplant?  No. Continue scheduling, no site restrictions.    Have you had a stroke or transient ischemic attack (TIA aka \"mini stroke\" in the last 6 months?   No    Have you been diagnosed with or been told you have cirrhosis of the liver?   No    Are you currently on dialysis?   No    Do you need assistance transferring?   No    BMI: Estimated body mass index is 29.8 kg/m  as calculated from the following:    Height as of 3/21/24: 1.676 m (5' 5.98\").    Weight as of 3/21/24: 83.7 kg (184 lb 8 oz).     Is patients BMI > 40 and scheduling location [facility]?  No    Do you take an injectable medication for weight loss or diabetes (excluding insulin)?  No    Do you take the medication Naltrexone?  No    Do you take blood thinners?  No       Prep   Are you currently on dialysis or do you have " chronic kidney disease?  No    Do you have a diagnosis of diabetes?  No    Do you have a diagnosis of cystic fibrosis (CF)?  No    On a regular basis do you go 3 -5 days between bowel movements?  Yes (Extended Prep)    BMI > 40?  No    Preferred Pharmacy:    [facility] [address] [place] [address]  [address]  [place] [address]  Phone: [phone] Fax: [phone]      Final Scheduling Details     Procedure scheduled  Colonoscopy    Surgeon:  BRIE     Date of procedure:  07/03/2024     Pre-OP / PAC:   Yes - Patient informed of pre-op requirement.    Location  [facility] - Patient preference.    Sedation   MAC/Deep Sedation - Per order.      Patient Reminders:   You will receive a call from a Nurse to review instructions and health history.  This assessment must be completed prior to your procedure.  Failure to complete the Nurse assessment may result in the procedure being cancelled.      On the day of your procedure, please designate an adult(s) who can drive you home stay with you for the next 24 hours. The medicines used in the exam will make you sleepy. You will not be able to drive.      You cannot take public transportation, ride share services, or non-medical taxi service without a responsible caregiver.  Medical transport services are allowed with the requirement that a responsible caregiver will receive you at your destination.  We require that drivers and caregivers are confirmed prior to your procedure.

## 2024-06-05 RX ORDER — BISACODYL 5 MG/1
TABLET, DELAYED RELEASE ORAL
Qty: 4 TABLET | Refills: 0 | Status: SHIPPED | OUTPATIENT
Start: 2024-06-05 | End: 2024-06-20

## 2024-06-05 NOTE — TELEPHONE ENCOUNTER
Extended Golytely Bowel Prep . Instructions were sent via mokono. Bowel prep was sent 6/5/2024 to    [facility] [address] [place] [address] [address] [facility].       Mallory Ortega RN Colorectal Cancer    Division of Gastroenterology at [facility]

## 2024-06-16 NOTE — COMMUNITY RESOURCES LIST (ENGLISH)
June 16, 2024           YOUR PERSONALIZED LIST OF SERVICES & PROGRAMS           & SHELTER    Housing      Free - Client Services  [address] [place] [address] (Distance: 22.4 miles)  Phone: [phone]  Website: https://Greenbird Integration Technology.Zollo/  Language: English  Fee: Free  Transportation Options: Free transportation      [facility]  Phone: [phone]  Website: https://www.HackerRankFashion Genome Project/  Language: English, Hmong, Oromo, Slovak, Upper sorbian  Hours: Mon 9:00 AM - 5:00 PM Tue 9:00 AM - 5:00 PM Wed 9:00 AM - 5:00 PM Thu 9:00 AM - 5:00 PM Fri 9:00 AM - 5:00 PM  Fee: Insurance  Accessibility: Blind accommodation, Deaf or hard of hearing, Translation services  Transportation Options: Free transportation      [facility] - Housing Stabilization Services  Phone: [phone]  Website: https://Flypad/Housing-Stabilization.html  Language: English  Hours: Mon 9:00 AM - 5:00 PM Tue 9:00 AM - 5:00 PM Wed 9:00 AM - 5:00 PM Thu 9:00 AM - 5:00 PM Fri 9:00 AM - 5:00 PM  Fee: Insurance  Accessibility: Deaf or hard of hearing, Translation services    Case Management      Living - Housing Stabilization Services  [address] [place] [address] (Distance: 19.0 miles)  Phone: [phone]  Website: https://www.[facility]  Language: Spanish, English, Slovak  Fee: Insurance, Self pay      [employer] - Integrated Community Support Services (ICS) and Individualized Home Support (IHS)  Phone: [phone]  Website: https://www.VeedMei.org/  Language: Spanish, English, Mohawk, Hmong, Slovak, Upper sorbian  Hours: Mon 8:00 AM - 5:00 PM Tue 8:00 AM - 5:00 PM Wed 8:00 AM - 5:00 PM Thu 8:00 AM - 5:00 PM Fri 8:00 AM - 5:00 PM  Fee: Insurance  Accessibility: Blind accommodation, Deaf or hard of hearing, Translation services      [employer] - Housing Stabilization Services  Phone: [phone]  Website:  https://Play for Job/  Language: English  Hours: Mon 8:00 AM - 4:00 PM Tue 8:00 AM - 4:00 PM Wed 8:00 AM - 4:00 PM Thu 8:00 AM - 4:00 PM Fri 8:00 AM - 4:00 PM  Fee: Free  Accessibility: Blind accommodation, Deaf or hard of hearing  Transportation Options: Free transportation    Drop-In Services      [facility] - Warming or cooling center - [facility]  [address] [place] [address] [place] [address] (Distance: 9.7 miles)  Phone: [phone]  Language: English, Macedonian, Edward, Guyanese, Ivorian, Dominican  Fee: Free      [facility] - Warming or cooling center - [facility]  [address] [place] [address] [address] [place] [address] (Distance: 7.1 miles)  Phone: [phone]  Language: English, Macedonian, Ivorian  Fee: Free  Accessibility: Translation services, Ada accessible      STATES POSTAL SERVICE - MAIL SERVICE FOR THE HOMELESS  Phone: [phone]  Website: https://Attila Resources            Medical Transportation, (NEMT)      Transportation - Transportation to medical appointments  [address] [place] [address] (Distance: 3.2 miles)  Phone: [phone]  Website: https://Odoo (formerly OpenERP)  Language: English  Fee: Self pay, Insurance  Accessibility: Ada accessible      Social Service [facility] - Neighbor to Neighbor Program  Phone: [phone]  Email: [email or web address].org  Website: https://www.[facility].org/services/older-adults/-services/neighbor-to-neighbor  Language: English  Hours: Mon 8:00 AM - 5:00 PM Tue 8:00 AM - 5:00 PM Wed 8:00 AM - 5:00 PM Thu 8:00 AM - 5:00 PM Fri 8:00 AM - 5:00 PM  Fee: Insurance, Self pay  Accessibility: Deaf or hard of hearing, Blind accommodation, Translation services      Ride Transportation - How BlueRide works  Phone: [phone]  Website: https://www.Merlin/members/shop-plans/[place]-health-care-programs/blueride-transportation  Language: English  Hours: Mon  8:00 AM - 5:00 PM Tue 8:00 AM - 5:00 PM Wed 8:00 AM - 5:00 PM Thu 8:00 AM - 5:00 PM Fri 8:00 AM - 5:00 PM    Expense Assistance      Care [place] - [facility] [place]  Email: info@aircarealliance.org  Website: https://www.aircarealliance.org      - Dislocated Worker/Adult [employer] Employment Program  Phone: [phone]  Email: [email or web address]@[email or web address]  Website: https://Zilliant/services/employment-services/dislocated-worker-program/  Language: English, Uzbek  Hours: Mon 8:00 AM - 4:30 PM Tue 8:00 AM - 4:30 PM Wed 8:00 AM - 4:30 PM Thu 8:00 AM - 4:30 PM Fri 8:00 AM - 4:30 PM  Fee: Free  Accessibility: Ada accessible      Ride Transportation - How BlueRide works  Phone: [phone]  Website: https://www.Vega-Chi/members/shop-plans/[place]-health-care-programs/blueride-transportation  Language: English  Hours: Mon 8:00 AM - 5:00 PM Tue 8:00 AM - 5:00 PM Wed 8:00 AM - 5:00 PM Thu 8:00 AM - 5:00 PM Fri 8:00 AM - 5:00 PM    Coordination      [facility] - Ride Care  [address] [place] [address] (Distance: 14.3 miles)  Website: https://www.BrandYourself/insurance/[place]-medical-assistance/  Language: English  Fee: Free  Transportation Options: Free transportation      - Services  Phone: [phone]  Website: https://Aktana/#howitworks-section  Hours: Sun 12:00 AM - 11:45 PM Mon 12:00 AM - 11:45 PM Tue 12:00 AM - 11:45 PM Wed 12:00 AM - 11:45 PM Thu 12:00 AM - 11:45 PM Fri 12:00 AM - 11:45 PM Sat 12:00 AM - 11:45 PM  Fee: Self pay  Accessibility: Ada accessible, Blind accommodation, Deaf or hard of hearing      Ride Transportation - How BlueRide works  Phone: [phone]  Website: https://www.Vega-Chi/members/shop-plans/[place]-health-care-programs/blueride-transportation  Language: English  Hours: Mon 8:00 AM - 5:00 PM Tue 8:00 AM - 5:00 PM Wed 8:00 AM - 5:00 PM Thu 8:00 AM - 5:00 PM Fri 8:00 AM - 5:00 PM            Bill Payment  [facility] - Care Coordination (Healthcare only)  Phone: [phone]  Website: https://[facility].org  Language: English, Hungarian  Hours: Wed 9:00 AM - 11:30 AM Thu 1:00 PM - 4:00 PM, 5:30 PM - 7:00 PM      30-Days Foundation - Energized  Phone: [phone]  Website: https://www.ktr32-dzhupkjpmfkkdf.org/programs.html  Language: English  Hours: Mon 7:00 AM - 7:00 PM Tue 7:00 AM - 7:00 PM Wed 7:00 AM - 7:00 PM Thu 7:00 AM - 7:00 PM Fri 7:00 AM - 7:00 PM  Fee: Free      - Dislocated Worker/Adult [employer] Employment Program  Phone: [phone]  Email: neealm@Affashion  Website: https://Affashion/services/employment-services/dislocated-worker-program/  Language: English, Prydeinig  Hours: Mon 8:00 AM - 4:30 PM Tue 8:00 AM - 4:30 PM Wed 8:00 AM - 4:30 PM Thu 8:00 AM - 4:30 PM Fri 8:00 AM - 4:30 PM  Fee: Free  Accessibility: Ada accessible               IMPORTANT NUMBERS & WEBSITES        Emergency Services  911  .   [facility]  211 http://211unitedway.org  .   Poison Control  (452) 243-3693 http://mnpoison.org http://wisconsinpoison.org  .     Suicide and Crisis Lifeline  988 http://988lifeline.org  .   Childhelp National Child Abuse Hotline  456.526.5201 http://Childhelphotline.org   .   National Sexual Assault Hotline  (123) 800-1900 (HOPE) http://Rainn.org   .     National Runaway Safeline  (310) 981-9232 (RUNAWAY) http://4LessrunaLeadGenius.org  .   Pregnancy & Postpartum Support  Call/text 188-408-6007  [place]: http://ppsupportmn.org  [place]: http://psichapters.com/wi  .   Substance Abuse National Helpline ([facility])  019-433-HELP (6447) http://Findtreatment.gov   .                DISCLAIMER: These resources have been generated via the Food52 Platform. Food52 does not endorse any service providers mentioned in this resource list. Food52 does not guarantee that the services mentioned in this resource list will be available to you or will improve your health or  wellness.    [facility]

## 2024-06-16 NOTE — COMMUNITY RESOURCES LIST (PATIENT PREFERRED LANGUAGE)
June 16, 2024           CAROLINE MARTINEZIZADO BELINDA SHASHI MGA SERBISYO at PROGRAMA           at [facility] Pabahay      Free - Client Services  [address] [place] [address] (Distansya: 22.4 milya)  Telepono: [phone]  Website: https://CinemaWell.com.MyFitnessPal/  Sandrita: Donna  Bayad: Idalia  Mga Pagpipilian sa Transportasyon: LibrDesigner Pages Online transportasyPhaneuf Hospital Health Care [facility]  Telepono: [phone]  Website: https://www.TokBoxMercy Health St. Elizabeth Youngstown HospitalQyer.com/  Wika: English, Hmong, Oromo, Bhutanese, Frisian  Mga oras: Lawanda 9:00 AM - 5:00 PM Mar 9:00 AM - 5:00 PM Miy 9:00 AM - 5:00 PM Huw 9:00 AM - 5:00 PM Biy 9:00 AM - 5:00 PM  Bayad: Insurance  Accessibility: Mga sheri zaidimunir, Aubrey diehlinig, Mga serbisyo sa pagsasalin  Mga Pagpipilian sa Transportasyon: Forrst      [facility] - Housing Stabilization Services  Telepono: [phone]  Website: https://[facility]/Housing-Stabilization.html  Sandrita: Donna Gonzalez oras: Lawanda 9:00 AM - 5:00 PM Mar 9:00 AM - 5:00 PM Miy 9:00 AM - 5:00 PM Huw 9:00 AM - 5:00 PM Biy 9:00 AM - 5:00 PM  Bayad: Insurance  Accessibility: Bingi heather diehlinig, Mga serbisyo sa pagsasalin    ng Kaso sa Pabahay      Living - Housing Stabilization Services  [address] [place] [address] (Distansya: 19.0 milya)  Telepono: [phone]  Website: https://www.Server Density  Wika: Bengali, English, Bhutanese  Bayad: Insurance, Self pay      [employer] - Integrated Community Support Services (ICS) and Individualized Home Support (IHS)  Telepono: [phone]  Website: https://www.Axsome Therapeuticsi.org/  Wika: Bengali, English, Indonesian, Hmong, Bhutanese, Frisian  Mga oras: Lawanda 8:00 AM - 5:00 PM Mar 8:00 AM - 5:00 PM Miy 8:00 AM - 5:00 PM Huw 8:00 AM - 5:00 PM Biy 8:00 AM - 5:00 PM  Bayad: Insurance  Accessibility: Aubrey Sanchez shara valentin sa  faithsasalin      [employer] - Housing Stabilization Services  Telepono: [phone]  Website: https://homebasemn.com/  Wiyo: Donna Gonzalez oras: Lawanda 8:00 AM - 4:00 PM Mar 8:00 AM - 4:00 PM Miy 8:00 AM - 4:00 PM Huw 8:00 AM - 4:00 PM Biy 8:00 AM - 4:00 PM  Bayad: Idalia  Accessibility: Blind accommodation, Bingi o mahina ang pandinig  Mga Pagpipilian sa Transportasyon: Libreng transportasyon    Serbisyo sa Pag-drop-In para sa [facility] - Warming or cooling center - [facility]  [address] [place] [address] [place] [address] (Distansya: 9.7 milya)  Telepono: [phone]  Wika: English, Korean, Edward, Liberian, Cypriot, Italian  Bayad: [facility] - Warming or cooling center [facility]  [address] [place] [address] [address] [place] [address] (Distansya: 7.1 milya)  Telepono: [phone]  Wika: Triston Thompson Somali  Bayad: Idalia  Accessibility: Mga serbisyo sa pagsasalin, brett-access [facility] POSTAL SERVICE - MAIL SERVICE FOR THE HOMELESS  Telepono: [phone]  Website: https://www.Futubra            amber Bates, (NEMT)      Transportation - Transportation to medical appointments  [address] [place] [address] (Distansya: 3.2 milya)  Telepono: [phone]  Website: https://BlossomandTwigs.com  Sandrita: Donna Ya: Self pay, Insurance  Accessibility: May accessible      Social Service [facility] - Neighbor to Neighbor Program  Telepono: [phone]  Email: [email or web address]  Website: https://www.[facility].org/services/older-adults/-services/neighbor-to-neighbor  Wika: Donna Gonzalez oras: Lawanda 8:00 AM - 5:00 PM Mar 8:00 AM - 5:00 PM Miy 8:00 AM - 5:00 PM Huw 8:00 AM - 5:00 PM Biy 8:00 AM - 5:00 PM  Bayad: Insurance, Self pay  Accessibility: Bingi o mahirap ang pandinig, Blind accommodation, Mga serbisyo sa pagsasalin      Ride  Transportation - How BlueRide works  Telepono: [phone]  Website: https://www.eHi Car Rental/members/shop-plans/[place]-health-care-programs/blueride-transportation  Wika: Donna Gonzalez oras: Lawanda 8:00 AM - 5:00 PM Mar 8:00 AM - 5:00 PM Miy 8:00 AM - 5:00 PM Huw 8:00 AM - 5:00 PM Biy 8:00 AM - 5:00 PM    sa Gastusin sa Transportasyon      Care [place] - [facility] [place]  Email: info@aircarealliance.org  Website: https://www.aircareallMedmonk.org      - Dislocated Worker/Adult [employer] Employment Program  Telepono: [phone]  Email: [email or web address]@[email or web address]  Website: https://Wear/services/employment-services/dislocated-worker-program/  Sandrita: Angelo Thompson oras: Lawanda 8:00 AM - 4:30 PM Mar 8:00 AM - 4:30 PM Miy 8:00 AM - 4:30 PM Huw 8:00 AM - 4:30 PM Biy 8:00 AM - 4:30 PM  Mariah Friedman  Accessibility: May accessible      Ride Transportation - How BlueRide works  Telepono: [phone]  Website: https://www.eHi Car Rental/members/shop-plans/[place]-health-care-programs/blueride-transportation  Wika: Donna Gonzalez oras: Lawanda 8:00 AM - 5:00 PM Mar 8:00 AM - 5:00 PM Miy 8:00 AM - 5:00 PM Huw 8:00 AM - 5:00 PM Biy 8:00 AM - 5:00 PM    sa Narciso      [facility] - Ride Care  [address] [place] [address] (Distansya: 14.3 milya)  Website: https://www.eFlix/insurance/[place]-medical-assistance/  Modesto Argueta: Idalia Shaffer sa Transportasyon: Libreng transportasyon      - Services  Telepono: [phone]  Website: https://Tripshare/#howitworks-section  Mga oras: Elise 12:00 AM - 11:45 PM Lawanda 12:00 AM - 11:45 PM Mar 12:00 AM - 11:45 PM Miy 12:00 AM - 11:45 PM Huw 12:00 AM - 11:45 PM Biy 12:00 AM - 11:45 PM Sab 12:00 AM - 11:45 PM  Bayad: Shahana argueta  Accessibility: May accessible, Blind accommodation, Aubrey huang pandini      Ride Transportation - How BlueRide works  Telepono: [phone]  Website:  https://www.Neurotrack/members/shop-plans/[place]-health-care-programs/blueride-transportation  Wika: Donna Gonzalez oras: Lawanda 8:00 AM - 5:00 PM Mar 8:00 AM - 5:00 PM Miy 8:00 AM - 5:00 PM Huw 8:00 AM - 5:00 PM Biy 8:00 AM - 5:00 PM        [facility] Services - Care Coordination (Healthcare only)  Telepono: [phone]  Website: https://[facility] HospitalThe Bauhub.org  Wika: Donna Triston Booa oras: Miy 9:00 AM - 11:30 AM Huw 1:00 PM - 4:00 PM, 5:30 PM - 7:00 PM      30-Days Foundation - Energized  Telepono: [phone]  Website: https://www.oqb94-uxotsdgqyduhkr.org/programs.html  Wika: Donna  Carlos oras: Lwaanda 7:00 AM - 7:00 PM Mar 7:00 AM - 7:00 PM Miy 7:00 AM - 7:00 PM Huw 7:00 AM - 7:00 PM Biy 7:00 AM - 7:00 PM  Mariah Friedman      - Dislocated Worker/Adult [employer] Employment Program  Telepono: [phone]  Email: [email or web address]  Website: https://Brightbluemn.org/services/employment-services/dislocated-worker-program/  Wiyo: Domingo Thompsonchristiano Gonzalez oras: Lawanda 8:00 AM - 4:30 PM Mar 8:00 AM - 4:30 PM Miy 8:00 AM - 4:30 PM Huw 8:00 AM - 4:30 PM Biy 8:00 AM - 4:30 PM  Bayad: Idalia  Accessibility: May accessible               MAHALAGANG RANDOLPH at WEBSITE        shara Bocanegrag-emergency  911  .   Zachary Bailon  211 http://211unitedway.org  .   Pagkontsharita Castro  [phone]) [phone] http://mnpoison.org http://wisconsinpoison.org  .     Pagpapakamatachito at [employer] Lifeline  988 http://988lifeline.org  .   Childhelp National Child Abuse Hotline  506.118.1961 http://Childhelphotline.org   .   [place] Sexual Assault Hotline  (755) 728-6318 (KENDAL) http://Rainn.org   .     National Runaway Safeline  (382) 456-2118 (RUNAWAY) http://Nanya Technology CorporationSRCH2.Pieceable  .   Pagbubupauly at Postpartum Support  Sheree/text sa 028-635-8208 [place]: http://ppsupportmn.org [place]: http://psichapters.com/wi  .   [place] Helpline sa Pag-abo sa Substance ([facility])  825-419-HELP (3122)  http://Findtreatment.gov   .                DISCLAIMER: Guillaume shara lottMercy Health Urbana Hospitaltan [facility] Platform. Ang UnitM[facility] Memorial Hospital champ alvarezi nag-eendeverett goel [facility] service provider  binJames E. Van Zandt Veterans Affairs Medical Centercharleen [facility] afshin owens. Erin smith ng [facility] ang shara lopez [facility] afshin villatoro.    [facility]

## 2024-06-20 ENCOUNTER — OFFICE VISIT (OUTPATIENT)
Dept: FAMILY MEDICINE | Facility: CLINIC | Age: 34
End: 2024-06-20
Payer: COMMERCIAL

## 2024-06-20 VITALS
HEIGHT: 66 IN | WEIGHT: 187.9 LBS | TEMPERATURE: 96.5 F | HEART RATE: 95 BPM | SYSTOLIC BLOOD PRESSURE: 120 MMHG | DIASTOLIC BLOOD PRESSURE: 80 MMHG | RESPIRATION RATE: 14 BRPM | OXYGEN SATURATION: 98 % | BODY MASS INDEX: 30.2 KG/M2

## 2024-06-20 DIAGNOSIS — F17.290 NICOTINE DEPENDENCE DUE TO VAPING TOBACCO PRODUCT: ICD-10-CM

## 2024-06-20 DIAGNOSIS — R79.89 ELEVATED LFTS: ICD-10-CM

## 2024-06-20 DIAGNOSIS — K76.0 HEPATIC STEATOSIS: ICD-10-CM

## 2024-06-20 DIAGNOSIS — E78.2 MIXED HYPERLIPIDEMIA: ICD-10-CM

## 2024-06-20 DIAGNOSIS — Z00.00 ROUTINE GENERAL MEDICAL EXAMINATION AT A HEALTH CARE FACILITY: Primary | ICD-10-CM

## 2024-06-20 DIAGNOSIS — Z11.4 SCREENING FOR HIV (HUMAN IMMUNODEFICIENCY VIRUS): ICD-10-CM

## 2024-06-20 DIAGNOSIS — Z23 NEED FOR HEPATITIS A AND B VACCINATION: ICD-10-CM

## 2024-06-20 DIAGNOSIS — K92.1 HEMATOCHEZIA: ICD-10-CM

## 2024-06-20 DIAGNOSIS — E55.9 HYPOVITAMINOSIS D: ICD-10-CM

## 2024-06-20 DIAGNOSIS — F15.21 METHAMPHETAMINE USE DISORDER, SEVERE, IN SUSTAINED REMISSION (H): ICD-10-CM

## 2024-06-20 DIAGNOSIS — Z13.21 ENCOUNTER FOR VITAMIN DEFICIENCY SCREENING: ICD-10-CM

## 2024-06-20 DIAGNOSIS — F31.78 BIPOLAR DISORDER, IN FULL REMISSION, MOST RECENT EPISODE MIXED (H): ICD-10-CM

## 2024-06-20 DIAGNOSIS — R73.03 PREDIABETES: ICD-10-CM

## 2024-06-20 DIAGNOSIS — Z91.89 PNEUMOCOCCAL VACCINATION INDICATED: ICD-10-CM

## 2024-06-20 LAB
ALBUMIN SERPL BCG-MCNC: 4.6 G/DL (ref 3.5–5.2)
ALP SERPL-CCNC: 71 U/L (ref 40–150)
ALT SERPL W P-5'-P-CCNC: 75 U/L (ref 0–70)
ANION GAP SERPL CALCULATED.3IONS-SCNC: 12 MMOL/L (ref 7–15)
AST SERPL W P-5'-P-CCNC: 46 U/L (ref 0–45)
BILIRUB SERPL-MCNC: 0.5 MG/DL
BUN SERPL-MCNC: 8.4 MG/DL (ref 6–20)
CALCIUM SERPL-MCNC: 9.1 MG/DL (ref 8.6–10)
CHLORIDE SERPL-SCNC: 105 MMOL/L (ref 98–107)
CHOLEST SERPL-MCNC: 218 MG/DL
CREAT SERPL-MCNC: 0.92 MG/DL (ref 0.67–1.17)
CREAT UR-MCNC: 115 MG/DL
DEPRECATED HCO3 PLAS-SCNC: 22 MMOL/L (ref 22–29)
EGFRCR SERPLBLD CKD-EPI 2021: >90 ML/MIN/1.73M2
FASTING STATUS PATIENT QL REPORTED: YES
FASTING STATUS PATIENT QL REPORTED: YES
FOLATE SERPL-MCNC: 12.6 NG/ML (ref 4.6–34.8)
GLUCOSE SERPL-MCNC: 112 MG/DL (ref 70–99)
HBA1C MFR BLD: 5.8 % (ref 0–5.6)
HDLC SERPL-MCNC: 47 MG/DL
HIV 1+2 AB+HIV1 P24 AG SERPL QL IA: NONREACTIVE
LDLC SERPL CALC-MCNC: 137 MG/DL
MAGNESIUM SERPL-MCNC: 2.2 MG/DL (ref 1.7–2.3)
NONHDLC SERPL-MCNC: 171 MG/DL
POTASSIUM SERPL-SCNC: 4.3 MMOL/L (ref 3.4–5.3)
PROT SERPL-MCNC: 7.5 G/DL (ref 6.4–8.3)
SODIUM SERPL-SCNC: 139 MMOL/L (ref 135–145)
TRIGL SERPL-MCNC: 172 MG/DL
VIT B12 SERPL-MCNC: 470 PG/ML (ref 232–1245)
VIT D+METAB SERPL-MCNC: 25 NG/ML (ref 20–50)

## 2024-06-20 PROCEDURE — 36415 COLL VENOUS BLD VENIPUNCTURE: CPT | Performed by: PHYSICIAN ASSISTANT

## 2024-06-20 PROCEDURE — 99395 PREV VISIT EST AGE 18-39: CPT | Mod: 25 | Performed by: PHYSICIAN ASSISTANT

## 2024-06-20 PROCEDURE — 80061 LIPID PANEL: CPT | Performed by: PHYSICIAN ASSISTANT

## 2024-06-20 PROCEDURE — 82607 VITAMIN B-12: CPT | Performed by: PHYSICIAN ASSISTANT

## 2024-06-20 PROCEDURE — 90471 IMMUNIZATION ADMIN: CPT | Performed by: PHYSICIAN ASSISTANT

## 2024-06-20 PROCEDURE — 82746 ASSAY OF FOLIC ACID SERUM: CPT | Performed by: PHYSICIAN ASSISTANT

## 2024-06-20 PROCEDURE — 90677 PCV20 VACCINE IM: CPT | Performed by: PHYSICIAN ASSISTANT

## 2024-06-20 PROCEDURE — 90636 HEP A/HEP B VACC ADULT IM: CPT | Performed by: PHYSICIAN ASSISTANT

## 2024-06-20 PROCEDURE — 84425 ASSAY OF VITAMIN B-1: CPT | Mod: 90 | Performed by: PHYSICIAN ASSISTANT

## 2024-06-20 PROCEDURE — 87389 HIV-1 AG W/HIV-1&-2 AB AG IA: CPT | Performed by: PHYSICIAN ASSISTANT

## 2024-06-20 PROCEDURE — 90472 IMMUNIZATION ADMIN EACH ADD: CPT | Performed by: PHYSICIAN ASSISTANT

## 2024-06-20 PROCEDURE — 82306 VITAMIN D 25 HYDROXY: CPT | Performed by: PHYSICIAN ASSISTANT

## 2024-06-20 PROCEDURE — 99214 OFFICE O/P EST MOD 30 MIN: CPT | Mod: 25 | Performed by: PHYSICIAN ASSISTANT

## 2024-06-20 PROCEDURE — G0481 DRUG TEST DEF 8-14 CLASSES: HCPCS | Performed by: PHYSICIAN ASSISTANT

## 2024-06-20 PROCEDURE — 83036 HEMOGLOBIN GLYCOSYLATED A1C: CPT | Performed by: PHYSICIAN ASSISTANT

## 2024-06-20 PROCEDURE — 83735 ASSAY OF MAGNESIUM: CPT | Performed by: PHYSICIAN ASSISTANT

## 2024-06-20 PROCEDURE — 80053 COMPREHEN METABOLIC PANEL: CPT | Performed by: PHYSICIAN ASSISTANT

## 2024-06-20 PROCEDURE — 99000 SPECIMEN HANDLING OFFICE-LAB: CPT | Performed by: PHYSICIAN ASSISTANT

## 2024-06-20 RX ORDER — BISACODYL 5 MG/1
TABLET, DELAYED RELEASE ORAL
Qty: 4 TABLET | Refills: 0 | Status: SHIPPED | OUTPATIENT
Start: 2024-06-20

## 2024-06-20 RX ORDER — OLANZAPINE 7.5 MG/1
7.5 TABLET, FILM COATED ORAL DAILY
Qty: 90 TABLET | Refills: 0 | Status: SHIPPED | OUTPATIENT
Start: 2024-06-20

## 2024-06-20 NOTE — LETTER
[facility]  [address] [place] [address]  [phone]                    July 2, 2024    Kevin Rivas  [address] [facility] [address]      Dear Kevin,    Here is a summary of your recent test results:    -LDL(bad) cholesterol level is elevated, and your triglycerides are elevated which can increase your heart disease risk.  A diet high in fat and simple carbohydrates, genetics and being overweight can contribute to this. ADVISE: exercising 150 minutes of aerobic exercise per week (30 minutes for 5 days per week or 50 minutes for 3 days per week are options), eating a low saturated fat/low carbohydrate diet, and omega-3 fatty acids (fish oil) 5185-2691 mg daily are helpful to improve this. I recommend starting a cholesterol lowering medication to lower your heart and stroke disease risk.  I am sending a prescription to your pharmacy: atorvastatin(Lipitor) 20 mg each evening.   -Liver and gallbladder tests (ALT,AST, Alk phos,bilirubin) are normal.   -Kidney function (GFR) is normal.   -Sodium is normal.   -Potassium is normal.   -Calcium is normal.   -Glucose and hemoglobin A1C are slightly elevated and may be a sign of early diabetes (prediabetes). ADVISE:: eating a low carbohydrate diet, exercising, trying to lose weight (if necessary) and rechecking your glucose level in 12 months.   -Magnesium is normal.   -B12, folic acid, and thiamine levels are normal.   -Your Vitamin D level is low-normal (despite reference ranges, optimal levels are closer to 50-70) and daily supplementation is recommended.  Please start a Vitamin D3 supplement of 5000 international unit(s) daily.  Typically this supplement is recommended to continue indefinitely as our sun exposure is quite limited (and sunscreen use limits what we absorb naturally in the summer months).   -Drug screen showed expected results. For additional lab test information, [email or web address] is an excellent  reference.     Your test results are enclosed.      Please contact me if you have any questions.    In addition, here is a list of due or overdue Health Maintenance reminders.        Please call us at [phone] (or use [employer]) to address the above recommendations.            Thank you very much for trusting [facility].     Healthy regards,      Deisy Jones PA-C         Results for orders placed or performed in visit on 06/20/24   Hemoglobin A1c     Status: Abnormal   Result Value Ref Range    Hemoglobin A1C 5.8 (H) 0.0 - 5.6 %   Comprehensive metabolic panel (BMP + Alb, Alk Phos, ALT, AST, Total. Bili, TP)     Status: Abnormal   Result Value Ref Range    Sodium 139 135 - 145 mmol/L    Potassium 4.3 3.4 - 5.3 mmol/L    Carbon Dioxide (CO2) 22 22 - 29 mmol/L    Anion Gap 12 7 - 15 mmol/L    Urea Nitrogen 8.4 6.0 - 20.0 mg/dL    Creatinine 0.92 0.67 - 1.17 mg/dL    GFR Estimate >90 >60 mL/min/1.73m2    Calcium 9.1 8.6 - 10.0 mg/dL    Chloride 105 98 - 107 mmol/L    Glucose 112 (H) 70 - 99 mg/dL    Alkaline Phosphatase 71 40 - 150 U/L    AST 46 (H) 0 - 45 U/L    ALT 75 (H) 0 - 70 U/L    Protein Total 7.5 6.4 - 8.3 g/dL    Albumin 4.6 3.5 - 5.2 g/dL    Bilirubin Total 0.5 <=1.2 mg/dL    Patient Fasting > 8hrs? Yes    Lipid panel reflex to direct LDL Fasting     Status: Abnormal   Result Value Ref Range    Cholesterol 218 (H) <200 mg/dL    Triglycerides 172 (H) <150 mg/dL    Direct Measure HDL 47 >=40 mg/dL    LDL Cholesterol Calculated 137 (H) <=100 mg/dL    Non HDL Cholesterol 171 (H) <130 mg/dL    Patient Fasting > 8hrs? Yes     Narrative    Cholesterol  Desirable:  <200 mg/dL    Triglycerides  Normal:  Less than 150 mg/dL  Borderline High:  150-199 mg/dL  High:  200-499 mg/dL  Very High:  Greater than or equal to 500 mg/dL    Direct Measure HDL  Female:  Greater than or equal to 50 mg/dL   Male:  Greater than or equal to 40 mg/dL    LDL Cholesterol  Desirable:  <100mg/dL  Above  Desirable:  100-129 mg/dL   Borderline High:  130-159 mg/dL   High:  160-189 mg/dL   Very High:  >= 190 mg/dL    Non HDL Cholesterol  Desirable:  130 mg/dL  Above Desirable:  130-159 mg/dL  Borderline High:  160-189 mg/dL  High:  190-219 mg/dL  Very High:  Greater than or equal to 220 mg/dL   Urine Drug Confirmation Panel     Status: None    Narrative    Interpretation:  Absent or none detected  This test was developed and its performance characteristics determined by the [facility],  Special Chemistry Laboratory. It has not been cleared or approved by the FDA. The laboratory is regulated under CLIA as qualified to perform high-complexity testing. This test is used for clinical purposes. It should not be regarded as investigational or for research.    Drugs with concentrations less than the cutoff will not be reported.  The drugs with applicable detection cutoff limits that are included within the Drug Confirmation Panel are:    The following drugs are detected with a cutoff of 3 ng/ml: FENTANYL    The following drugs are detected with a cutoff of 5 ng/mL: 6-ACETYLMORPHINE, BUPRENORPHINE, NALOXONE, NORBUPRENORPHINE, NORFENTANYL    The following drugs are detected with a cutoff of 10 ng/mL: SUFENTANIL    The following drugs are detected with a cutoff of 20 ng/mL: PCP (PHENCYCLIDINE)    The following drugs are detected with a cutoff of 50 ng/mL: 7-AMINOCLONAZEPAM, 7-AMINOFLUNITRAZEPAM, ALPRAZOLAM, AMPHETAMINE, A-OH-ALPRAZOLAM, A-OH-MIDAZOLAM, A-OH-TRIAZOLAM, BENZOYLECGONINE (Cocaine Metabolite), CLONAZEPAM, COCAETHYLENE (Cocaine Metabolite), CODEINE, DIAZEPAM, DIHYDROCODEINE, EDDP (Methadone Metabolite), HYDROCODONE, HYDROMORPHONE, LORAZEPAM, MDA (3,4-Methylenedioxyamphetamine), MDEA (3,2-Wqqikusplqtjkt-Z-ethylcathinone), MDMA (Methylenedioxyamphetamine,Ecstasy), MEPERIDINE, METHADONE, METHAMPHETAMINE, METHYLPHENIDATE (Ritalin), MORPHINE, NALTREXONE, N-DESMETH-TAPENTADOL, NORCODEINE,  NORDIAZEPAM, NORMEPERIDINE, O-ANTONIA-TRAMADOL, OXAZEPAM, OXYCODONE, OXYMORPHONE, PROPOXYPHENE, RITALINIC ACID, TAPENTADOL, TEMAZEPAM, THEBAINE, TRAMADOL    The following drugs are detected with a cutoff of 100 ng/mL: GABAPENTIN, KETAMINE    The following drugs are detected with a cutoff of 200 ng/mL: PREGABALIN, XYLAZINE     Urine Creatinine for Drug Screen Panel     Status: None   Result Value Ref Range    Creatinine Urine for Drug Screen 115 mg/dL   HIV Antigen Antibody Combo     Status: Normal   Result Value Ref Range    HIV Antigen Antibody Combo Nonreactive Nonreactive   Vitamin B12     Status: Normal   Result Value Ref Range    Vitamin B12 470 232 - 1,245 pg/mL   Vitamin D Deficiency     Status: Normal   Result Value Ref Range    Vitamin D, Total (25-Hydroxy) 25 20 - 50 ng/mL    Narrative    Season, race, dietary intake, and treatment affect the concentration of 25-hydroxy-Vitamin D. Values may decrease during winter months and increase during summer months.    Vitamin D determination is routinely performed by an immunoassay specific for 25 hydroxyvitamin D3.  If an individual is on vitamin D2(ergocalciferol) supplementation, please specify 25 OH vitamin D2 and D3 level determination by LCMSMS test VITD23.     Folate     Status: Normal   Result Value Ref Range    Folic Acid 12.6 4.6 - 34.8 ng/mL   Vitamin B1 whole blood     Status: None   Result Value Ref Range    Vitamin B1 Whole Blood Level 102 70 - 180 nmol/L   Magnesium     Status: Normal   Result Value Ref Range    Magnesium 2.2 1.7 - 2.3 mg/dL   Drug Confirmation Panel Urine with Creat - lab collect     Status: None    Narrative    The following orders were created for panel order Drug Confirmation Panel Urine with Creat - lab collect.  Procedure                               Abnormality         Status                     ---------                               -----------         ------                     Urine Drug Confirmation ...[[id number]]                       Final result               Urine Creatinine for Andrea...[[id number]]                      Final result                 Please view results for these tests on the individual orders.

## 2024-06-20 NOTE — LETTER
[facility]  [address] [place] [address] [facility] [id number] [phone]       June 20, 2024    Kevin Rivas  [address] [facility] [address]    To Whom it May Concern:    Hind-Alcoholikong Matabang Sakit sa Atay (NAFLD)  Sa NAFLD, ang taba ay naiipon sa atay na nagdudulot ng pamamaga. Ang patuloy na pamamaga (IVY) ay maaaring humantong sa pagkakaroon ng peklat at cirrhosis. Ang pangmatagalang komplikasyon ng matabang sakit sa atay ay kinabibilangan ng mas mataas na panganib ng komplikasyon sa sakit sa puso, panganib ng pagkakaroon ng diabetes (kaitlyn tapia), at [facility] pagusbon ng cirrhosis at end stage liver disease.    Pamamahala ng NAFLD/IVY  - Ang pangunahing paraan ng paggamot ay karaniwang pagbabawas ng timbang sa pamamagitan ng kombinasyon ng malusog na diyeta at [facility]. Ang pagbabawas ng timbang ay tumutugon sa mga kondisyon na nag-aambag sa NAFLD. Ideal na mabawasan ang 10% ng timbang ng katawan, ngunit ang pagpapabuti sa mga risk factors ay maaaring makita kahit na mabawasan ng 3% hanggang 5% ng iyong panimulang timbang. Ang operasyon sa pagbabawas ng timbang ay shruthi ring opsyon para sa mga kailangang magbawas ng malaking timbang.    - Iminumungkahi ang regular na pag-eehersisyo: 4 o higit butch beses kada linggo, na may average na 45 minuto kada araw.              - Ipinapakita na ang mga pasyenteng regular na nag-eehersisyo ay maaaring magkaroon ng pagpapabuti sa insulin resistance at resolusyon ng matabang sakit sa atay, kahit na erin sila makapagbawas ng timbang.    - Imyeseniaungbhargav ang mababang-carbohydrate, mababang-calorie na diyeta ([id number] calories kada araw). Mahalagang alisin ang mga idinagdag na asukal, limitahan ang taba sa <10 hanggang 15% (mas mainam na <5%) ng kabuuang calories, at [facility] ang alak.  - Iminumungbhargav huang agresibong [facility] Hospital[facility] ng diabetes: ideal na layunin ng Hgb A1c =/< 7%. Kaitlyn maaari, ang pagdaragdag ng insulin  "sensitizing agents tulad ng metformin o liraglutide ay makakatulong.  - Mahalaga rin ang pamamahala ng kolesterol.  - Ang paggamit ng \"statins\" (HMG-CoA reductase medications) ay isang epektibong paraan ng paggamot at [facility] Center[facility] sa mga may abnormal na liver tests O mga may cirrhosis. Ang halaga ng mga gamot na graciela sa populasyon na graciela ay higit na mas mahalaga kaysa sa maliit na panganib ng abnormal na liver tests.  - Layunin na LDL sa mga may IVY ay < 100 mg/dL  - Isaalang-alang ang kahalagahan ng liberalisasyon ng konsumo ng kape dahil may ilang datos na maaaring pabagalin [facility] ang pag-usbong at [facility] ang epekto ng IVY-related fibrosis.   - Sa mga pag-aaral ng mga taong may nonalcoholic fatty liver disease, ang mga nag-ulat na umiinom ng dalawa o higit butch tasa ng kape kada araw ay may mas kaunting pinsala sa atay kaysa sa mga umiinom ng kaunti o walang kape. Erin pa josew kaitlyn paano naaapektuhan ng kape ang pinsala sa atay, ngunit ang mga natuklasan ay nagpapahiwatig na maaari itong maglaman ng ilang mga compound na maaaring may papel sa paglaban sa pamamaga.    Kaitlyn umiinom ka na ng kape, maaaring maging mas moon ang pakiramdam mo tungkol sa iyong umaga na tasa ng kape. Ngunit kaitlyn erin ka pa umiinom ng kape, malamang na erin graciela magandang dahilan upang magsimula. Talakayin ang mga posibleng benepisyo ng kape sa iyong tagapagbigay ng serbisyo.      Sincerely,    Deisy Jones MBA, MS, PA-C  M [facility]"

## 2024-06-20 NOTE — PROGRESS NOTES
Preventive Care Visit  [facility] [place]IOR [place]  Deisy Gita Jones PA-C, Family Medicine  Jun 20, 2024      Assessment & Plan     Routine general medical examination at a health care facility  - PRIMARY CARE FOLLOW-UP SCHEDULING    Bipolar disorder, in full remission, most recent episode mixed (H24) - Dr. Reyes Camp  Methamphetamine use disorder, severe, in sustained remission (H) -smoking no IVDU -sober since approximately April 2023.  Was in treatment most recently at [facility] for a total of 3 months.  Patient reports sobriety since most recent treatment.  Denies significant alcohol use.  Continue sobriety efforts encouraged.  Patient needs to establish/reestablish with psychiatry.  Will reach out to Dr. Camp and I will also place a Ora referral in the case that he is unable to get into Dr. Camp due to the elapsed time since his previous professional relationship  - OLANZapine (ZYPREXA) 7.5 MG tablet  Dispense: 90 tablet; Refill: 0  - [facility] Referral  - Drug Confirmation Panel Urine with Creat - lab collect    Hematochezia  Sounds to be secondary to constipation.  Colonoscopy upcoming.  - polyethylene glycol (GOLYTELY) 236 g suspension  Dispense: 8000 mL; Refill: 0  - bisacodyl (DULCOLAX) 5 MG EC tablet  Dispense: 4 tablet; Refill: 0    Elevated LFTs  Prediabetes  Hepatic steatosis  Mixed hyperlipidemia  Once again, diet and exercise efforts encouraged.  Labs for surveillance.  Will keep patient apprised of results and if further interventions needed  - Hemoglobin A1c  - Comprehensive metabolic panel (BMP + Alb, Alk Phos, ALT, AST, Total. Bili, TP)  - Lipid panel reflex to direct LDL Fasting    Nicotine dependence due to vaping tobacco product  Tapering strongly advised.  Recommend trial of nicotine gum as this may help with oral fixation.  No pharmaceutical interventions unless cleared by psychiatry.  - nicotine (NICORETTE) 2 MG gum  Dispense: 100  each; Refill: 10    Screening for HIV (human immunodeficiency virus)  Routine screening  - HIV Antigen Antibody Combo    Encounter for vitamin deficiency screening  Labs to rule out metabolic deficiencies  - Vitamin B12  - Vitamin D Deficiency  - Folate  - Vitamin B1 whole blood  - Magnesium    Need for hepatitis A and B vaccination  Pneumococcal vaccination indicated  Vaccinated today  - Pneumococcal 20 Valent Conjugate (Prevnar 20)  - HEP A & B (T[facility])      Patient has been advised of split billing requirements and indicates understanding: Yes        Counseling  Appropriate preventive services were discussed with this patient, including applicable screening as appropriate for fall prevention, nutrition, physical activity, Tobacco-use cessation, weight loss and cognition.  Checklist reviewing preventive services available has been given to the patient.  Reviewed patient's diet, addressing concerns and/or questions.   The patient was instructed to see the dentist every 6 months.       Return in about 4 weeks (around 7/18/2024) for Dr. Camp.    Deisy Jones MBA, MS, PA-C  [facility]   Kevin is a 34 year old, presenting for the following:  Physical        6/20/2024     6:49 AM   Additional Questions   Roomed by Claudia ANN   Accompanied by Mom        Health Care Directive  Patient does not have a Health Care Directive or Living Will: Discussed advance care planning with patient; however, patient declined at this time.    HPI    Fatty liver/Elevated LFTs/hematochezia/obesity  Kevin initially presented on 3/6/2024 with abdominal pain, decreased appetite, emesis, and dark stools. ER evaluation revealed hepatic steatosis (fatty liver changes) and elevated liver function tests (LFTs), leading to a referral to hepatology. In follow-up, Kevin denied symptoms such as fevers, chills, nausea, emesis, abdominal pain, and diarrhea but reported intermittent bright red blood per  rectum for several years without unintentional weight loss or history of abdominal surgeries. GI evaluation on 3/12/2024 found no signs of decompensated liver disease, normal INR, normal platelets, and a FibroScan indicating no advanced fibrosis. His AST levels normalized, ALT was mildly elevated, and tests for hepatitis B, thyroid issues, and iron overload were negative (not immune to hepatitis B). Metabolic risk factors included obesity, prediabetes, and hyperlipidemia. Lifestyle recommendations focused on weight loss through dietary changes, portion control, avoiding carbohydrates, sugars, processed foods, and high fructose items, and incorporating aerobic and resistance training at least three times a week. Weight loss medications such as semaglutide or liraglutide were considered for those with BMI >27 with comorbid conditions or BMI >30. Imaging studies from the initial presentation on 3/6/2024, including an abdominal ultrasound and a CT abdomen pelvis with contrast, confirmed hepatic steatosis without acute processes, gallstones, or biliary obstruction. GI recommendations included normal/negative results from autoimmune, hepatitis B and C, and celiac testing, and no contraindications for prescribing a statin. Emphasis was placed on dietary and exercise modifications with a goal of 7-10% weight loss. During a follow-up appointment with Deisy Jones PA-C, on 3/21/2024,     Kevin was encouraged to continue dietary changes and start regular exercise, aiming for a 15 to 20 pound weight loss, with a follow-up scheduled in three months. The conclusion was that Isidros hepatic steatosis with elevated LFTs was likely related to metabolic risk factors, with a management plan focusing on lifestyle modifications to promote weight loss and improve metabolic health, and follow-up care transferred to his primary care provider.    Today patient reports that he has tried to make dietary efforts but has not been overly  successful.  Colonoscopy is scheduled for 7/3/2024    Bipolar disorder, in full remission, most recent episode mixed (H24) - Dr. Reyes Camp  Methamphetamine use disorder, severe, in sustained remission (H) -smoking no IVDU -sober since approximately April 2023.  Was in treatment most recently at [facility] for a total of 3 months.  Evaluated by me on 3/21/2024.  Patient reports overall good control of his symptoms with Zyprexa.  I advised patient that he has a longstanding history of substance abuse and bipolar disorder that is still fairly early in his diagnosis and treatment/remission.  I will give him a 3-month prescription but want him to continue his relationship with psychiatry, Dr. Camp.  He has not followed up with Dr. Camp.  Reiterated to patient and mother that due to complex history psychiatry management imperative.    Personal history of tobacco use, presenting hazards to health  Vaping daily.  Tried nicotine patches but was still vaping when using the patches.  No decreased urge.  Has never tried gum or lozenges.  Would like clearance from psychiatry before considering any oral regimen such as Chantix or bupropion.  Patient using nicotine patches and tapering.  Encouraged continuation.       Wt Readings from Last 10 Encounters:   06/20/24 85.2 kg (187 lb 14.4 oz)   03/21/24 83.7 kg (184 lb 8 oz)   03/12/24 84.2 kg (185 lb 9.6 oz)   03/06/24 86.2 kg (189 lb 15.9 oz)   04/03/21 63.5 kg (140 lb)   03/26/17 61.7 kg (136 lb)   06/30/16 62.8 kg (138 lb 8 oz)   07/23/15 65.2 kg (143 lb 12.8 oz)   04/22/11 61.5 kg (135 lb 9.6 oz)             6/15/2024   General Health   How would you rate your overall physical health? Good   Feel stress (tense, anxious, or unable to sleep) Not at all            6/15/2024   Nutrition   Three or more servings of calcium each day? Yes   Diet: Low fat/cholesterol   How many servings of fruit and vegetables per day? (!) 0-1   How many sweetened beverages each  day? 0-1             No data to display                  6/15/2024   Social Factors   Worry food won't last until get money to buy more No   Food not last or not have enough money for food? No   Do you have housing? (Housing is defined as stable permanent housing and does not include staying ouside in a car, in a tent, in an abandoned building, in an overnight shelter, or couch-surfing.) No   Are you worried about losing your housing? No   Lack of transportation? Yes   Unable to get utilities (heat,electricity)? Yes   Want help with housing or utility concern? No       (!) TRANSPORTATION CONCERN PRESENT(!) HOUSING CONCERN PRESENT(!) FINANCIAL RESOURCE STRAIN CONCERN      6/15/2024   Dental   Dentist two times every year? (!) NO            6/15/2024   TB Screening   Were you born outside of the [place]? Yes        Today's PHQ-2 Score:       3/21/2024     8:11 AM   PHQ-2 ( 1999 Pf[employer])   Q1: Little interest or pleasure in doing things 0   Q2: Feeling down, depressed or hopeless 0   PHQ-2 Score 0   Q1: Little interest or pleasure in doing things Not at all   Q2: Feeling down, depressed or hopeless Not at all   PHQ-2 Score 0         6/15/2024   Substance Use   Alcohol more than 3/day or more than 7/wk No   Do you use any other substances recreationally? No        Social History     Tobacco Use    Smoking status: Every Day     Types: Vaping Device     Start date: 2003    Smokeless tobacco: Never    Tobacco comments:     Smoked since age 13, 1/4 PPD until 2023 - then started vaping nicotine   Vaping Use    Vaping status: Every Day    Substances: Nicotine   Substance Use Topics    Alcohol use: Not Currently     Comment: sober since treatment 2023    Drug use: Not Currently     Comment: smoking meth since age 15 - sober since treatment 2023             6/15/2024   One time HIV Screening   Previous HIV test? No          6/15/2024   STI Screening   New sexual partner(s) since last STI/HIV test? No            6/15/2024  "  Contraception/Family Planning   Questions about contraception or family planning No           Reviewed and updated as needed this visit by Provider   Tobacco  Allergies  Meds  Problems  Med Hx  Surg Hx  Fam Hx  Soc   Hx Sexual Activity          Past Medical History:   Diagnosis Date    Bipolar 2 disorder (H)     Depression     Hepatitis     ? in [facility]    Substance use disorder     Meth use     Past Surgical History:   Procedure Laterality Date    NO HISTORY OF SURGERY           Review of Systems  Constitutional, HEENT, cardiovascular, pulmonary, GI, , musculoskeletal, neuro, skin, endocrine and psych systems are negative, except as otherwise noted.     Objective    Exam  /80   Pulse 95   Temp (!) 96.5  F (35.8  C) (Tympanic)   Resp 14   Ht 1.676 m (5' 5.98\")   Wt 85.2 kg (187 lb 14.4 oz)   SpO2 98%   BMI 30.35 kg/m     Estimated body mass index is 30.35 kg/m  as calculated from the following:    Height as of this encounter: 1.676 m (5' 5.98\").    Weight as of this encounter: 85.2 kg (187 lb 14.4 oz).    Physical Exam  GENERAL: alert and no distress  EYES: Eyes grossly normal to inspection, PERRL and conjunctivae and sclerae normal  HENT: ear canals and TM's normal, nose and mouth without ulcers or lesions  NECK: no adenopathy, no asymmetry, masses, or scars  RESP: lungs clear to auscultation - no rales, rhonchi or wheezes  CV: regular rate and rhythm, normal S1 S2, no S3 or S4, no murmur, click or rub, no peripheral edema  ABDOMEN: soft, nontender, no hepatosplenomegaly, no masses and bowel sounds normal  MS: no gross musculoskeletal defects noted, no edema  SKIN: no suspicious lesions or rashes  NEURO: Normal strength and tone, mentation intact and speech normal  PSYCH: mentation appears normal, affect normal/bright    Results for orders placed or performed in visit on 06/20/24   Hemoglobin A1c     Status: Abnormal   Result Value Ref Range    Hemoglobin A1C 5.8 (H) 0.0 - 5.6 %   Drug " Confirmation Panel Urine with Creat - lab collect     Status: None (In process)    Narrative    The following orders were created for panel order Drug Confirmation Panel Urine with Creat - lab collect.  Procedure                               Abnormality         Status                     ---------                               -----------         ------                     Urine Drug Confirmation ...[[id number]]                      In process                 Urine Creatinine for Andrea...[[id number]]                      In process                   Please view results for these tests on the individual orders.         Signed Electronically by: Deisy Jones PA-C

## 2024-06-20 NOTE — PATIENT INSTRUCTIONS
"Patient Education   Preventive Care Advice   This is general advice we often give to help people stay healthy. Your care team may have specific advice just for you. Please talk to your care team about your own preventive care needs.  Lifestyle  Exercise at least 150 minutes each week (30 minutes a day, 5 days a week).  Do muscle strengthening activities 2 days a week. These help control your weight and prevent disease.  No smoking.  Wear sunscreen to prevent skin cancer.  Have your home tested for radon every 2 to 5 years. Radon is a colorless, odorless gas that can harm your lungs. To learn more, go to www.health.[facility].[place].us and search for \"Radon in Homes.\"  Keep guns unloaded and locked up in a safe place like a safe or gun vault, or, use a gun lock and hide the keys. Always lock away bullets separately. To learn more, visit [employer].gov and search for \"safe gun storage.\"  Nutrition  Eat 5 or more servings of fruits and vegetables each day.  Try wheat bread, brown rice and whole grain pasta (instead of white bread, rice, and pasta).  Get enough calcium and vitamin D. Check the label on foods and aim for 100% of the RDA (recommended daily allowance).  Regular exams  Have a dental exam and cleaning every 6 months.  See your health care team every year to talk about:  Any changes in your health.  Any medicines your care team has prescribed.  Preventive care, family planning, and ways to prevent chronic diseases.  Shots (vaccines)   HPV shots (up to age 26), if you've never had them before.  Hepatitis B shots (up to age 59), if you've never had them before.  COVID-19 shot: Get this shot when it's due.  Flu shot: Get a flu shot every year.  Tetanus shot: Get a tetanus shot every 10 years.  Pneumococcal, hepatitis A, and RSV shots: Ask your care team if you need these based on your risk.  Shingles shot (for age 50 and up).  General health tests  Diabetes screening:  Starting at age 35, Get screened for diabetes at least " every 3 years.  If you are younger than age 35, ask your care team if you should be screened for diabetes.  Cholesterol test: At age 39, start having a cholesterol test every 5 years, or more often if advised.  Bone density scan (DEXA): At age 50, ask your care team if you should have this scan for osteoporosis (brittle bones).  Hepatitis C: Get tested at least once in your life.  Abdominal aortic aneurysm screening: Talk to your doctor about having this screening if you:  Have ever smoked; and  Are biologically male; and  Are between the ages of 65 and 75.  STIs (sexually transmitted infections)  Before age 24: Ask your care team if you should be screened for STIs.  After age 24: Get screened for STIs if you're at risk. You are at risk for STIs (including HIV) if:  You are sexually active with more than one person.  You don't use condoms every time.  You or a partner was diagnosed with a sexually transmitted infection.  If you are at risk for HIV, ask about PrEP medicine to prevent HIV.  Get tested for HIV at least once in your life, whether you are at risk for HIV or not.  Cancer screening tests  Cervical cancer screening: If you have a cervix, begin getting regular cervical cancer screening tests at age 21. Most people who have regular screenings with normal results can stop after age 65. Talk about this with your provider.  Breast cancer scan (mammogram): If you've ever had breasts, begin having regular mammograms starting at age 40. This is a scan to check for breast cancer.  Colon cancer screening: It is important to start screening for colon cancer at age 45.  Have a colonoscopy test every 10 years (or more often if you're at risk) Or, ask your provider about stool tests like a FIT test every year or Cologuard test every 3 years.  To learn more about your testing options, visit: www.[employer]/638868.pdf.  For help making a decision, visit: ulysses/ee68407.  Prostate cancer screening test: If you have a  prostate and are age 55 to 69, ask your provider if you would benefit from a yearly prostate cancer screening test.  Lung cancer screening: If you are a current or former smoker age 50 to 80, ask your care team if ongoing lung cancer screenings are right for you.  For informational purposes only. Not to replace the advice of your health care provider. Copyright   2023 [facility]. All rights reserved. Clinically reviewed by the [facility]. [employer] 819650 - REV 04/24.    Non-Alcoholic Fatty Liver Disease (NAFLD)  With NAFLD, fat deposits in the liver which leads to inflammation.  Chronic inflammation (IVY) can ultimately lead to scarring and cirrhosis.  The long-term complications of fatty liver disease include the increased risk of cardiovascular disease complications,the risk of developing diabetes (if not already), and variable progression to cirrhosis and end stage liver disease.    Management of NAFLD/IVY  -The first line of treatment is usually weight loss through a combination of a healthy diet and exercise. Losing weight addresses the conditions that contribute to NAFLD. Ideally, a loss of 10% of body weight is desirable, but improvement in risk factors can become apparent if you lose even 3% to 5% of your starting weight. Weight-loss surgery is also an option for those who need to lose a great deal of weight.    - Recommend exercise regularly: 4+ times per week, with an average of about 45 minutes per day.               - It has shown that patients who exercise regularly can have improvement of insulin resistance and resolution of fatty liver disease, even if they are not able to lose weight.     - Recommend a low-carbohydrate, low-calorie diet (8088-9104 calories per day).   It is crucial to eliminate added sugars, restrict fat to <10 to 15% (preferably <5%) of total calories, and abstain from alcohol.  - Recommend aggressive diabetes management: ideal goal Hgb  "A1c goal of =/< 7%. If possible addition of insulin sensitizing agents like metformin or liraglutide will be helpful.    - Management of cholesterol is also very important.    - The use of \"statins\" (HMG-CoA reductase medications) are an effective means of therapy and are not contra-indicated in those with abnormal liver tests OR those with cirrhosis.  The value of these medications in this population far outweigh the minor risks of abnormal liver tests.   - Goal LDL in those with IVY are < 100 mg/dL  - Consider the utility of liberalizing coffee consumption as some data that this may slow progression and reverse effects of IVY-related fibrosis.   -In studies of people with nonalcoholic fatty liver disease, those who reported drinking two or more cups of coffee a day had less liver damage than those who drank little or no coffee. It's not yet clear how coffee may influence liver damage, but findings suggest it may contain certain compounds that may play a role in fighting inflammation.    If you already drink coffee, these results may make you feel better about your morning cup of coffee. But if you don't already drink coffee, this probably isn't a good reason to start. Discuss the potential benefits of coffee with your provider.       "

## 2024-06-25 ENCOUNTER — TELEPHONE (OUTPATIENT)
Dept: GASTROENTEROLOGY | Facility: CLINIC | Age: 34
End: 2024-06-25
Payer: COMMERCIAL

## 2024-06-25 LAB — VIT B1 PYROPHOSHATE BLD-SCNC: 102 NMOL/L

## 2024-06-25 NOTE — TELEPHONE ENCOUNTER
Pre visit planning completed.      Procedure details:    Patient scheduled for Colonoscopy  on 7/3/2024.     Arrival time: 0830. Procedure time 0930    Facility location: [facility]; [facility] [place] [address]. Check in location: 1st Floor [facility].     Sedation type: MAC-Hx severe chemical dependency-in recovery    Pre op exam needed? Yes. Patient had a preventive  appointment 6/20/2024,  with   Deisy Jones PA-C. Not sure if this can be used for a pre op? -OK per Maria Del Rosario at        Indication for procedure:   Hematochezia            Chart review:     Electronic implanted devices? No    Recent diagnosis of diverticulitis within the last 6 weeks? No    Diabetic? Prediabetic.       Medication review:    Anticoagulants? No    NSAIDS? No NSAID medications per patient's medication list.  RN will verify with pre-assessment call.    Other medication HOLDING recommendations:  N/A      Prep for procedure:     Bowel prep recommendation: Extended Golytely. Bowel prep prescription sent to    [facility] [address] [facility] [place], [place] - [address]    Due to: constipation noted or reported.     Prep instructions sent via [employer] by [facility] nurse 6/5/2024.          Татьяна Berkowitz RN  Endoscopy Procedure Pre Assessment RN  [phone] option 4

## 2024-06-26 NOTE — TELEPHONE ENCOUNTER
[facility]  [facility]    Attempted to contact patient in order to complete pre assessment questions.     No answer. Left message to return call to [phone] option 4    Pre-op needed? YES, Patient had a preventive  appointment 6/20/2024,  with   Deisy Jones PA-C. -OK per Maria Del Rosario at  to use        Callback required communication sent via [employer] .    Flavia Valenzuela RN  Endoscopy Procedure Pre Assessment

## 2024-06-27 NOTE — TELEPHONE ENCOUNTER
Upon review of pre op endo call tab, patient completed pre assessment call with [facility] nursing staff (Bella Chao, MISBAH) on 6/26/24.    Pre assessment marked as completed.       Lisa Mueller RN  Endoscopy Procedure Pre-Assessment RN  [phone], option 4

## 2024-06-28 RX ORDER — ATORVASTATIN CALCIUM 20 MG/1
20 TABLET, FILM COATED ORAL DAILY
Qty: 90 TABLET | Refills: 3 | Status: SHIPPED | OUTPATIENT
Start: 2024-06-28

## 2024-06-29 NOTE — RESULT ENCOUNTER NOTE
Triage: please advise patient of results/recommendations.  Team: please mail copy of result note in Tagalog (at bottom of note)     Kevin  I have reviewed your recent test results:    -LDL(bad) cholesterol level is elevated, and your triglycerides are elevated which can increase your heart disease risk.  A diet high in fat and simple carbohydrates, genetics and being overweight can contribute to this. ADVISE: exercising 150 minutes of aerobic exercise per week (30 minutes for 5 days per week or 50 minutes for 3 days per week are options), eating a low saturated fat/low carbohydrate diet, and omega-3 fatty acids (fish oil) 4661-0043 mg daily are helpful to improve this. I recommend starting a cholesterol lowering medication to lower your heart and stroke disease risk.  I am sending a prescription to your pharmacy: atorvastatin(Lipitor) 20 mg each evening.   -Liver and gallbladder tests (ALT,AST, Alk phos,bilirubin) are normal.  -Kidney function (GFR) is normal.  -Sodium is normal.  -Potassium is normal.  -Calcium is normal.  -Glucose and hemoglobin A1C are slightly elevated and may be a sign of early diabetes (prediabetes). ADVISE:: eating a low carbohydrate diet, exercising, trying to lose weight (if necessary) and rechecking your glucose level in 12 months.  -Magnesium is normal.  -B12, folic acid, and thiamine levels are normal.  -Your Vitamin D level is low-normal (despite reference ranges, optimal levels are closer to 50-70) and daily supplementation is recommended.  Please start a Vitamin D3 supplement of 5000 international unit(s) daily.  Typically this supplement is recommended to continue indefinitely as our sun exposure is quite limited (and sunscreen use limits what we absorb naturally in the summer months).   -Drug screen showed expected results.      For additional lab test information, www.[email or web address] is an excellent reference.     If you have any questions please do not hesitate to contact our office  via phone ([phone]) or [facility].    Healthy regards,     Deisy Jones MBA, MS, PA-C  [facility]        **Sinuri ko ang iyong mga kamakailang resulta ng pagsusuri:**    - **LDL (masamang) kolesterol** ay mataas, at ang iyong mga **triglycerides** ay mataas din na maaaring magpataas ng iyong panganib sa sakit sa puso. Ang isang diyeta na mataas sa taba at simpleng carbohydrates, genetika, at sobrang timbang ay maaaring mag-ambag dito. **ALEXX**: Mag-ehersisyo ng 150 minuto ng aerobic exercise bawat linggo (30 minuto sa loob ng 5 araw bawat linggo o 50 minuto sa loob ng 3 araw bawat linggo ay mga opsyon), kumain ng diyeta na mababa sa saturated fat/mababa sa carbohydrates, at omega-3 fatty acids (fish oil) 8933-4264 mg araw-araw ay [facility] upang mapabuti graciela. Inirerekomenda ko ang pagsisimula ng gamot na pampababa ng kolesterol upaUP Health Systemawasan ang iyong panganib sa sakit sa puso at stroke. Magpapadala ako ng reseta sa iyong botika: atorvastatin (Lipitor) 20 mg bawat elie.  - Ang mga pagsusuri sa **atay at apdo** (ALT, AST, Alk phos, bilirubin) ay normal.  - Ang **paggana ng bato** (GFR) ay normal.  - Ang **sodium** ay normal.  - Ang **potassium** ay normal.  - Ang **calcium** ay normal.  - Ang **glucose** at **hemoglobin A1C** ay bahagyang mataas at maaaring senyales ng maagang diabetes (prediabetes). **ALEXX**: Kumain ng diyeta na mababa sa carbohydrates, mag-ehersisyo, subukang magbawas ng timbang (lalita chatman) at muling suriin ang iyong antas ng glucose sa loob ng 12 buwan.  - Ang **magnesium** ay normal.  - Ang mga antas ng **B12**, **folic acid**, at **thiamine** ay normal.  - Ang iyong **Vitamin D** level ay jerman-normal (sa kabila ng mga reference ranges, ang mga optimal levels ay mas malapit sa 50-70) at [facility] ang araw-araw na suplemento. Mangyaring [facility]  Vitamin D3 supplement na 5000 international unit(s) araw-araw. [facility] na  ipagpatuloy sal suplementkeli graciela rigo reji celaya ang ating exposure sa araw ay medyo limitado (at ang paggamit ng sunscreen ay naglilimita sa ating natural na pagsipsip  mgshara buwan ng tag-init).  - Ang **drug screen** champ boyer inaasahang resulta.    Para sa karagdagang impormasyon  faithsusuri merlin laboratoryo, ang [email or web address] champ mandujano na sanggunian.    carrington Teague mag-atubiling makipag-ugnayan  aming opisina  [facility] telepono ([phone]) o MyChart.    Malussun mustafati,

## 2024-07-03 ENCOUNTER — ANESTHESIA (OUTPATIENT)
Dept: GASTROENTEROLOGY | Facility: CLINIC | Age: 34
End: 2024-07-03
Payer: COMMERCIAL

## 2024-07-03 ENCOUNTER — HOSPITAL ENCOUNTER (OUTPATIENT)
Facility: CLINIC | Age: 34
Discharge: HOME | End: 2024-07-03
Attending: COLON & RECTAL SURGERY | Admitting: COLON & RECTAL SURGERY
Payer: COMMERCIAL

## 2024-07-03 ENCOUNTER — ANESTHESIA EVENT (OUTPATIENT)
Dept: GASTROENTEROLOGY | Facility: CLINIC | Age: 34
End: 2024-07-03
Payer: COMMERCIAL

## 2024-07-03 VITALS
SYSTOLIC BLOOD PRESSURE: 109 MMHG | HEART RATE: 64 BPM | WEIGHT: 187 LBS | HEIGHT: 66 IN | OXYGEN SATURATION: 96 % | DIASTOLIC BLOOD PRESSURE: 70 MMHG | BODY MASS INDEX: 30.05 KG/M2 | RESPIRATION RATE: 15 BRPM

## 2024-07-03 LAB — COLONOSCOPY: NORMAL

## 2024-07-03 PROCEDURE — 45378 DIAGNOSTIC COLONOSCOPY: CPT | Performed by: NURSE ANESTHETIST, CERTIFIED REGISTERED

## 2024-07-03 PROCEDURE — 45378 DIAGNOSTIC COLONOSCOPY: CPT | Performed by: COLON & RECTAL SURGERY

## 2024-07-03 PROCEDURE — 250N000009 HC RX 250: Performed by: NURSE ANESTHETIST, CERTIFIED REGISTERED

## 2024-07-03 PROCEDURE — 370N000017 HC ANESTHESIA TECHNICAL FEE, PER MIN: Performed by: COLON & RECTAL SURGERY

## 2024-07-03 PROCEDURE — 45378 DIAGNOSTIC COLONOSCOPY: CPT | Performed by: ANESTHESIOLOGY

## 2024-07-03 PROCEDURE — 250N000011 HC RX IP 250 OP 636: Performed by: NURSE ANESTHETIST, CERTIFIED REGISTERED

## 2024-07-03 PROCEDURE — G0121 COLON CA SCRN NOT HI RSK IND: HCPCS | Performed by: COLON & RECTAL SURGERY

## 2024-07-03 PROCEDURE — 258N000003 HC RX IP 258 OP 636: Performed by: NURSE ANESTHETIST, CERTIFIED REGISTERED

## 2024-07-03 PROCEDURE — 999N000010 HC STATISTIC ANES STAT CODE-CRNA PER MINUTE: Performed by: COLON & RECTAL SURGERY

## 2024-07-03 RX ORDER — PROPOFOL 10 MG/ML
INJECTION, EMULSION INTRAVENOUS CONTINUOUS PRN
Status: DISCONTINUED | OUTPATIENT
Start: 2024-07-03 | End: 2024-07-03

## 2024-07-03 RX ORDER — PROPOFOL 10 MG/ML
INJECTION, EMULSION INTRAVENOUS PRN
Status: DISCONTINUED | OUTPATIENT
Start: 2024-07-03 | End: 2024-07-03

## 2024-07-03 RX ORDER — DEXMEDETOMIDINE HYDROCHLORIDE 4 UG/ML
INJECTION, SOLUTION INTRAVENOUS PRN
Status: DISCONTINUED | OUTPATIENT
Start: 2024-07-03 | End: 2024-07-03

## 2024-07-03 RX ORDER — SODIUM CHLORIDE, SODIUM LACTATE, POTASSIUM CHLORIDE, CALCIUM CHLORIDE 600; 310; 30; 20 MG/100ML; MG/100ML; MG/100ML; MG/100ML
INJECTION, SOLUTION INTRAVENOUS CONTINUOUS PRN
Status: DISCONTINUED | OUTPATIENT
Start: 2024-07-03 | End: 2024-07-03

## 2024-07-03 RX ORDER — LIDOCAINE 40 MG/G
CREAM TOPICAL
Status: DISCONTINUED | OUTPATIENT
Start: 2024-07-03 | End: 2024-07-03 | Stop reason: HOSPADM

## 2024-07-03 RX ORDER — LIDOCAINE HYDROCHLORIDE 20 MG/ML
INJECTION, SOLUTION INFILTRATION; PERINEURAL PRN
Status: DISCONTINUED | OUTPATIENT
Start: 2024-07-03 | End: 2024-07-03

## 2024-07-03 RX ORDER — ONDANSETRON 2 MG/ML
INJECTION INTRAMUSCULAR; INTRAVENOUS PRN
Status: DISCONTINUED | OUTPATIENT
Start: 2024-07-03 | End: 2024-07-03

## 2024-07-03 RX ORDER — ONDANSETRON 2 MG/ML
4 INJECTION INTRAMUSCULAR; INTRAVENOUS
Status: DISCONTINUED | OUTPATIENT
Start: 2024-07-03 | End: 2024-07-03 | Stop reason: HOSPADM

## 2024-07-03 RX ADMIN — ONDANSETRON 4 MG: 2 INJECTION INTRAMUSCULAR; INTRAVENOUS at 09:36

## 2024-07-03 RX ADMIN — PROPOFOL 200 MCG/KG/MIN: 10 INJECTION, EMULSION INTRAVENOUS at 09:36

## 2024-07-03 RX ADMIN — DEXMEDETOMIDINE HYDROCHLORIDE 20 MCG: 200 INJECTION INTRAVENOUS at 09:34

## 2024-07-03 RX ADMIN — LIDOCAINE HYDROCHLORIDE 40 MG: 20 INJECTION, SOLUTION INFILTRATION; PERINEURAL at 09:36

## 2024-07-03 RX ADMIN — SODIUM CHLORIDE, POTASSIUM CHLORIDE, SODIUM LACTATE AND CALCIUM CHLORIDE: 600; 310; 30; 20 INJECTION, SOLUTION INTRAVENOUS at 09:31

## 2024-07-03 RX ADMIN — PROPOFOL 50 MG: 10 INJECTION, EMULSION INTRAVENOUS at 09:36

## 2024-07-03 ASSESSMENT — ACTIVITIES OF DAILY LIVING (ADL)
ADLS_ACUITY_SCORE: 35
ADLS_ACUITY_SCORE: 35

## 2024-07-03 ASSESSMENT — LIFESTYLE VARIABLES: TOBACCO_USE: 1

## 2024-07-03 NOTE — ANESTHESIA CARE TRANSFER NOTE
Patient: Kevin Rivas    Procedure: Procedure(s):  Colonoscopy       Diagnosis: Hematochezia [K92.1]  Diagnosis Additional Information: No value filed.    Anesthesia Type:   MAC     Note:    Oropharynx: oropharynx clear of all foreign objects  Level of Consciousness: awake  Oxygen Supplementation: nasal cannula  Level of Supplemental Oxygen (L/min / FiO2): 4  Independent Airway: airway patency satisfactory and stable  Dentition: dentition unchanged  Vital Signs Stable: post-procedure vital signs reviewed and stable  Report to RN Given: handoff report given  Patient transferred to: Phase II    Handoff Report: Identifed the Patient, Identified the Reponsible Provider, Reviewed the pertinent medical history, Discussed the surgical course, Reviewed Intra-OP anesthesia mangement and issues during anesthesia, Set expectations for post-procedure period and Allowed opportunity for questions and acknowledgement of understanding  Vitals:  Vitals Value Taken Time   BP     Temp     Pulse     Resp     SpO2         Electronically Signed By: LEEROY Grimes CRNA  July 3, 2024  9:58 AM

## 2024-07-03 NOTE — ANESTHESIA POSTPROCEDURE EVALUATION
Patient: Kevin Rivas    Procedure: Procedure(s):  Colonoscopy       Anesthesia Type:  MAC    Note:  Disposition: Outpatient   Postop Pain Control: Uneventful            Sign Out: Well controlled pain   PONV: No   Neuro/Psych: Uneventful            Sign Out: Acceptable/Baseline neuro status   Airway/Respiratory: Uneventful            Sign Out: Acceptable/Baseline resp. status   CV/Hemodynamics: Uneventful            Sign Out: Acceptable CV status; No obvious hypovolemia; No obvious fluid overload   Other NRE: NONE   DID A NON-ROUTINE EVENT OCCUR? No           Last vitals:  Vitals Value Taken Time   /70 07/03/24 1030   Temp     Pulse 70 07/03/24 1032   Resp 21 07/03/24 1032   SpO2 95 % 07/03/24 1032   Vitals shown include unfiled device data.    Electronically Signed By: Steve Read DO  July 3, 2024  4:04 PM

## 2024-07-03 NOTE — ANESTHESIA PREPROCEDURE EVALUATION
Anesthesia Pre-Procedure Evaluation    Patient: Kevin Rivas   MRN: [id number] : 1990        Procedure : Procedure(s):  Colonoscopy          Past Medical History:   Diagnosis Date    Bipolar 2 disorder (H)     Depression     Hepatitis     ? in [facility]    Substance use disorder     Meth use      Past Surgical History:   Procedure Laterality Date    NO HISTORY OF SURGERY        No Known Allergies   Social History     Tobacco Use    Smoking status: Every Day     Types: Vaping Device     Start date:     Smokeless tobacco: Never    Tobacco comments:     Smoked since age 13, 1/ PPD until  - then started vaping nicotine   Substance Use Topics    Alcohol use: Not Currently     Comment: sober since treatment       Wt Readings from Last 1 Encounters:   24 85.2 kg (187 lb 14.4 oz)        Anesthesia Evaluation            ROS/MED HX  ENT/Pulmonary: Comment: Nicotine dependence due to vaping tobacco product    (+)                tobacco use,                        Neurologic:       Cardiovascular:       METS/Exercise Tolerance:     Hematologic:       Musculoskeletal:       GI/Hepatic: Comment: Hematochezia    Elevated LFTs  Prediabetes  Hepatic steatosis    (+)           hepatitis  liver disease,       Renal/Genitourinary:       Endo:       Psychiatric/Substance Use:     (+) psychiatric history bipolar   Recreational drug usage: Meth.    Infectious Disease:       Malignancy:       Other:               OUTSIDE LABS:  CBC:   Lab Results   Component Value Date    WBC 5.9 2024    WBC 8.8 2024    HGB 16.5 2024    HGB 17.3 2024    HCT 48.4 2024    HCT 50.7 2024     2024     2024     BMP:   Lab Results   Component Value Date     2024     2024    POTASSIUM 4.3 2024    POTASSIUM 3.6 2024    CHLORIDE 105 2024    CHLORIDE 104 2024    CO2 22 2024    CO2 23 2024    BUN 8.4 2024    BUN  "9.3 03/12/2024    CR 0.92 06/20/2024    CR 0.98 03/12/2024     (H) 06/20/2024     (H) 03/12/2024     COAGS:   Lab Results   Component Value Date    INR 1.03 03/12/2024     POC: No results found for: \"BGM\", \"HCG\", \"HCGS\"  HEPATIC:   Lab Results   Component Value Date    ALBUMIN 4.6 06/20/2024    PROTTOTAL 7.5 06/20/2024    ALT 75 (H) 06/20/2024    AST 46 (H) 06/20/2024    ALKPHOS 71 06/20/2024    BILITOTAL 0.5 06/20/2024     OTHER:   Lab Results   Component Value Date    A1C 5.8 (H) 06/20/2024    EVA 9.1 06/20/2024    MAG 2.2 06/20/2024    LIPASE 43 03/06/2024    TSH 2.19 03/06/2024       Anesthesia Plan    ASA Status:  3    NPO Status:  NPO Appropriate    Anesthesia Type: MAC.     - Reason for MAC: immobility needed, straight local not clinically adequate              Consents    Anesthesia Plan(s) and associated risks, benefits, and realistic alternatives discussed. Questions answered and patient/representative(s) expressed understanding.     - Discussed:     - Discussed with:  Patient      - Extended Intubation/Ventilatory Support Discussed: No.      - Patient is DNR/DNI Status: No     Use of blood products discussed: No .     Postoperative Care    Pain management: IV analgesics, Oral pain medications, Multi-modal analgesia.   PONV prophylaxis: Ondansetron (or other 5HT-3), Dexamethasone or Solumedrol, Background Propofol Infusion     Comments:               Steve Read, DO    I have reviewed the pertinent notes and labs in the chart from the past 30 days and (re)examined the patient.  Any updates or changes from those notes are reflected in this note.              # Obesity: Estimated body mass index is 30.35 kg/m  as calculated from the following:    Height as of 6/20/24: 1.676 m (5' 5.98\").    Weight as of 6/20/24: 85.2 kg (187 lb 14.4 oz).      "

## 2024-07-05 NOTE — RESULT ENCOUNTER NOTE
Kevin  I have reviewed your recent test results:    Colonoscopy is normal aside from some hemorrhoids.  Repeat in 10 years for routine surveillance.    For additional lab test information, www.[email or web address] is an excellent reference.     If you have any questions please do not hesitate to contact our office via phone ([phone]) or MyChart.    Healthy regards,     Deisy Jones MBA, MS, PA-C  [facility]

## 2024-09-27 ENCOUNTER — IMMUNIZATION (OUTPATIENT)
Dept: FAMILY MEDICINE | Facility: CLINIC | Age: 34
End: 2024-09-27

## 2024-09-27 DIAGNOSIS — Z23 NEED FOR PROPHYLACTIC VACCINATION AND INOCULATION AGAINST INFLUENZA: Primary | ICD-10-CM

## 2024-09-27 PROCEDURE — 90656 IIV3 VACC NO PRSV 0.5 ML IM: CPT

## 2024-09-27 PROCEDURE — 90471 IMMUNIZATION ADMIN: CPT

## 2024-12-26 ENCOUNTER — TELEPHONE (OUTPATIENT)
Dept: FAMILY MEDICINE | Facility: CLINIC | Age: 34
End: 2024-12-26

## 2024-12-26 DIAGNOSIS — F31.78 BIPOLAR DISORDER, IN FULL REMISSION, MOST RECENT EPISODE MIXED (H): ICD-10-CM

## 2024-12-26 RX ORDER — OLANZAPINE 7.5 MG/1
7.5 TABLET, FILM COATED ORAL DAILY
Qty: 90 TABLET | Refills: 0 | Status: SHIPPED | OUTPATIENT
Start: 2024-12-26

## 2024-12-26 NOTE — TELEPHONE ENCOUNTER
id [id number]  Pt states he cannot come in person today, he is working. Next available appointment is 1/2/25. Pt has his first appointment with Dr. Camp on 1/2/25 and has been out of Olanzapine for about 2 weeks. He requests a refill from PCP until he can see Dr. Camp. Please advise on Olanzapine refill and when to schedule Pt.     Radha Vazquez RN [place] T[facility]

## 2024-12-26 NOTE — PROGRESS NOTES
Refill of olanzapine sent to pharmacy to bridge until he sees Dr. Camp    OK to cancel appt today as not needed.      Deisy Jones MBA, MS, PA-C  [facility]

## 2024-12-26 NOTE — TELEPHONE ENCOUNTER
Writer called to let Pt know Rx sent and to cancel today's appointment as no longer needed. No Answer and not able to LVM.     Radha Vazquez RN [facility]

## 2024-12-26 NOTE — PROGRESS NOTES
Patient is on my schedule for a telephone visit at the end of the day for a medication recheck through an  -this is not an appropriate appointment.    At his last visit in June he was advised to follow-up with the [facility] psychiatrist, Dr. Reyes Camp to continue his Zyprexa and mental health management due to his history of bipolar disorder and substance use disorder.  Can you clarify if he has done this?  If not, does he have something scheduled?    If he is following with psychiatry, he should continue to get his refills of olanzapine through their office, if he has not, then I can see him but need to see him in person (had concerns with understanding previously and feel that this would be further amplified through a virtual visit) also, he was started on atorvastatin for his cholesterol and it appears that he is only filled this 1 time for 90 days 6/2024.  He needs to understand that he needs to continue this medication and we can recheck his fasting labs when he is seen in the office.      Deisy Jones MBA, MS, PA-C  [facility]

## 2024-12-27 NOTE — TELEPHONE ENCOUNTER
"Olanzapine rx sent to [facility]      ID: [id number]    Attempt # 2    Called # [phone]      unable to leave voicemail on patient's line. Awaiting call back to notify patient of rx waiting at the pharmacy. \"unable to leave message\" per intepreter     Reab Andre RN on 12/27/2024 at 11:26 AM   [facility]    "

## 2024-12-30 NOTE — TELEPHONE ENCOUNTER
Called # [phone] with [facility] , ID# [id number]    Called and spoke with patient.     Advised of providers recommendation. Patient stated an understanding and agreed with plan.     KIRTI SHEARER RN on 12/30/2024 at 10:33 AM   [facility]       No

## 2025-03-24 ENCOUNTER — MYC REFILL (OUTPATIENT)
Dept: FAMILY MEDICINE | Facility: CLINIC | Age: 35
End: 2025-03-24
Payer: COMMERCIAL

## 2025-03-24 DIAGNOSIS — E55.9 HYPOVITAMINOSIS D: ICD-10-CM

## 2025-03-24 DIAGNOSIS — E78.2 MIXED HYPERLIPIDEMIA: ICD-10-CM

## 2025-03-24 RX ORDER — ATORVASTATIN CALCIUM 20 MG/1
20 TABLET, FILM COATED ORAL DAILY
Qty: 90 TABLET | Refills: 3 | OUTPATIENT
Start: 2025-03-24

## 2025-06-19 SDOH — HEALTH STABILITY: PHYSICAL HEALTH: ON AVERAGE, HOW MANY MINUTES DO YOU ENGAGE IN EXERCISE AT THIS LEVEL?: 60 MIN

## 2025-06-19 SDOH — HEALTH STABILITY: PHYSICAL HEALTH: ON AVERAGE, HOW MANY DAYS PER WEEK DO YOU ENGAGE IN MODERATE TO STRENUOUS EXERCISE (LIKE A BRISK WALK)?: 5 DAYS

## 2025-06-19 ASSESSMENT — SOCIAL DETERMINANTS OF HEALTH (SDOH): HOW OFTEN DO YOU GET TOGETHER WITH FRIENDS OR RELATIVES?: TWICE A WEEK

## 2025-06-24 ENCOUNTER — OFFICE VISIT (OUTPATIENT)
Dept: FAMILY MEDICINE | Facility: CLINIC | Age: 35
End: 2025-06-24
Attending: PHYSICIAN ASSISTANT
Payer: COMMERCIAL

## 2025-06-24 VITALS
TEMPERATURE: 96.5 F | RESPIRATION RATE: 14 BRPM | HEART RATE: 81 BPM | HEIGHT: 66 IN | OXYGEN SATURATION: 99 % | DIASTOLIC BLOOD PRESSURE: 80 MMHG | WEIGHT: 173.7 LBS | SYSTOLIC BLOOD PRESSURE: 138 MMHG | BODY MASS INDEX: 27.92 KG/M2

## 2025-06-24 DIAGNOSIS — R03.0 ELEVATED BLOOD PRESSURE READING WITHOUT DIAGNOSIS OF HYPERTENSION: ICD-10-CM

## 2025-06-24 DIAGNOSIS — Z13.0 SCREENING FOR DEFICIENCY ANEMIA: ICD-10-CM

## 2025-06-24 DIAGNOSIS — R73.03 PREDIABETES: ICD-10-CM

## 2025-06-24 DIAGNOSIS — Z01.84 IMMUNITY STATUS TESTING: ICD-10-CM

## 2025-06-24 DIAGNOSIS — K76.0 HEPATIC STEATOSIS: ICD-10-CM

## 2025-06-24 DIAGNOSIS — F31.78 BIPOLAR DISORDER, IN FULL REMISSION, MOST RECENT EPISODE MIXED: ICD-10-CM

## 2025-06-24 DIAGNOSIS — E78.2 MIXED HYPERLIPIDEMIA: ICD-10-CM

## 2025-06-24 DIAGNOSIS — E66.3 OVERWEIGHT WITH BODY MASS INDEX (BMI) 25.0-29.9: ICD-10-CM

## 2025-06-24 DIAGNOSIS — F15.21 METHAMPHETAMINE USE DISORDER, SEVERE, IN SUSTAINED REMISSION (H): ICD-10-CM

## 2025-06-24 DIAGNOSIS — F17.290 NICOTINE DEPENDENCE DUE TO VAPING TOBACCO PRODUCT: ICD-10-CM

## 2025-06-24 DIAGNOSIS — Z00.00 ROUTINE GENERAL MEDICAL EXAMINATION AT A HEALTH CARE FACILITY: Primary | ICD-10-CM

## 2025-06-24 DIAGNOSIS — E55.9 HYPOVITAMINOSIS D: ICD-10-CM

## 2025-06-24 PROBLEM — K92.1 HEMATOCHEZIA: Status: RESOLVED | Noted: 2024-03-21 | Resolved: 2025-06-24

## 2025-06-24 LAB
ALBUMIN SERPL BCG-MCNC: 4.9 G/DL (ref 3.5–5.2)
ALP SERPL-CCNC: 107 U/L (ref 40–150)
ALT SERPL W P-5'-P-CCNC: 156 U/L (ref 0–70)
ANION GAP SERPL CALCULATED.3IONS-SCNC: 13 MMOL/L (ref 7–15)
AST SERPL W P-5'-P-CCNC: 115 U/L (ref 0–45)
BILIRUB SERPL-MCNC: 0.5 MG/DL
BUN SERPL-MCNC: 12 MG/DL (ref 6–20)
CALCIUM SERPL-MCNC: 9.7 MG/DL (ref 8.8–10.4)
CHLORIDE SERPL-SCNC: 104 MMOL/L (ref 98–107)
CHOLEST SERPL-MCNC: 214 MG/DL
CREAT SERPL-MCNC: 0.91 MG/DL (ref 0.67–1.17)
EGFRCR SERPLBLD CKD-EPI 2021: >90 ML/MIN/1.73M2
ERYTHROCYTE [DISTWIDTH] IN BLOOD BY AUTOMATED COUNT: 13.1 % (ref 10–15)
EST. AVERAGE GLUCOSE BLD GHB EST-MCNC: 123 MG/DL
FASTING STATUS PATIENT QL REPORTED: NO
FASTING STATUS PATIENT QL REPORTED: NO
GLUCOSE SERPL-MCNC: 119 MG/DL (ref 70–99)
HBA1C MFR BLD: 5.9 % (ref 0–5.6)
HCO3 SERPL-SCNC: 23 MMOL/L (ref 22–29)
HCT VFR BLD AUTO: 45.5 % (ref 40–53)
HDLC SERPL-MCNC: 51 MG/DL
HGB BLD-MCNC: 15.7 G/DL (ref 13.3–17.7)
LDLC SERPL CALC-MCNC: 146 MG/DL
MCH RBC QN AUTO: 28.1 PG (ref 26.5–33)
MCHC RBC AUTO-ENTMCNC: 34.5 G/DL (ref 31.5–36.5)
MCV RBC AUTO: 82 FL (ref 78–100)
NONHDLC SERPL-MCNC: 163 MG/DL
PLATELET # BLD AUTO: 280 10E3/UL (ref 150–450)
POTASSIUM SERPL-SCNC: 3.9 MMOL/L (ref 3.4–5.3)
PROT SERPL-MCNC: 8.2 G/DL (ref 6.4–8.3)
RBC # BLD AUTO: 5.58 10E6/UL (ref 4.4–5.9)
SODIUM SERPL-SCNC: 140 MMOL/L (ref 135–145)
TRIGL SERPL-MCNC: 84 MG/DL
VIT D+METAB SERPL-MCNC: 35 NG/ML (ref 20–50)
WBC # BLD AUTO: 4.9 10E3/UL (ref 4–11)

## 2025-06-24 PROCEDURE — 3044F HG A1C LEVEL LT 7.0%: CPT | Performed by: PHYSICIAN ASSISTANT

## 2025-06-24 PROCEDURE — 36415 COLL VENOUS BLD VENIPUNCTURE: CPT | Performed by: PHYSICIAN ASSISTANT

## 2025-06-24 PROCEDURE — 3050F LDL-C >= 130 MG/DL: CPT | Performed by: PHYSICIAN ASSISTANT

## 2025-06-24 PROCEDURE — 99395 PREV VISIT EST AGE 18-39: CPT | Performed by: PHYSICIAN ASSISTANT

## 2025-06-24 PROCEDURE — 83036 HEMOGLOBIN GLYCOSYLATED A1C: CPT | Performed by: PHYSICIAN ASSISTANT

## 2025-06-24 PROCEDURE — 99214 OFFICE O/P EST MOD 30 MIN: CPT | Mod: 25 | Performed by: PHYSICIAN ASSISTANT

## 2025-06-24 PROCEDURE — 82306 VITAMIN D 25 HYDROXY: CPT | Performed by: PHYSICIAN ASSISTANT

## 2025-06-24 PROCEDURE — G2211 COMPLEX E/M VISIT ADD ON: HCPCS | Performed by: PHYSICIAN ASSISTANT

## 2025-06-24 PROCEDURE — 80061 LIPID PANEL: CPT | Performed by: PHYSICIAN ASSISTANT

## 2025-06-24 PROCEDURE — 3079F DIAST BP 80-89 MM HG: CPT | Performed by: PHYSICIAN ASSISTANT

## 2025-06-24 PROCEDURE — 85027 COMPLETE CBC AUTOMATED: CPT | Performed by: PHYSICIAN ASSISTANT

## 2025-06-24 PROCEDURE — 3075F SYST BP GE 130 - 139MM HG: CPT | Performed by: PHYSICIAN ASSISTANT

## 2025-06-24 PROCEDURE — 80053 COMPREHEN METABOLIC PANEL: CPT | Performed by: PHYSICIAN ASSISTANT

## 2025-06-24 PROCEDURE — 86706 HEP B SURFACE ANTIBODY: CPT | Performed by: PHYSICIAN ASSISTANT

## 2025-06-24 RX ORDER — OLANZAPINE 7.5 MG/1
7.5 TABLET, FILM COATED ORAL DAILY
COMMUNITY
Start: 2025-06-24

## 2025-06-24 RX ORDER — ATORVASTATIN CALCIUM 20 MG/1
20 TABLET, FILM COATED ORAL DAILY
Qty: 90 TABLET | Refills: 0 | Status: SHIPPED | OUTPATIENT
Start: 2025-06-24

## 2025-06-24 RX ORDER — ATORVASTATIN CALCIUM 20 MG/1
20 TABLET, FILM COATED ORAL DAILY
Qty: 90 TABLET | Refills: 3 | Status: SHIPPED | OUTPATIENT
Start: 2025-06-24

## 2025-06-24 NOTE — PATIENT INSTRUCTIONS
Patient Education   Preventive Care Advice   This is general advice given by our system to help you stay healthy. However, your care team may have specific advice just for you. Please talk to your care team about your preventive care needs.  Nutrition  Eat 5 or more servings of fruits and vegetables each day.  Try wheat bread, brown rice and whole grain pasta (instead of white bread, rice, and pasta).  Get enough calcium and vitamin D. Check the label on foods and aim for 100% of the RDA (recommended daily allowance).  Lifestyle  Exercise at least 150 minutes each week  (30 minutes a day, 5 days a week).  Do muscle strengthening activities 2 days a week. These help control your weight and prevent disease.  No smoking.  Wear sunscreen to prevent skin cancer.  Have a dental exam and cleaning every 6 months.  Yearly exams  See your health care team every year to talk about:  Any changes in your health.  Any medicines your care team has prescribed.  Preventive care, family planning, and ways to prevent chronic diseases.  Shots (vaccines)   HPV shots (up to age 26), if you've never had them before.  Hepatitis B shots (up to age 59), if you've never had them before.  COVID-19 shot: Get this shot when it's due.  Flu shot: Get a flu shot every year.  Tetanus shot: Get a tetanus shot every 10 years.  Pneumococcal, hepatitis A, and RSV shots: Ask your care team if you need these based on your risk.  Shingles shot (for age 50 and up)  General health tests  Diabetes screening:  Starting at age 35, Get screened for diabetes at least every 3 years.  If you are younger than age 35, ask your care team if you should be screened for diabetes.  Cholesterol test: At age 39, start having a cholesterol test every 5 years, or more often if advised.  Bone density scan (DEXA): At age 50, ask your care team if you should have this scan for osteoporosis (brittle bones).  Hepatitis C: Get tested at least once in your life.  STIs (sexually  transmitted infections)  Before age 24: Ask your care team if you should be screened for STIs.  After age 24: Get screened for STIs if you're at risk. You are at risk for STIs (including HIV) if:  You are sexually active with more than one person.  You don't use condoms every time.  You or a partner was diagnosed with a sexually transmitted infection.  If you are at risk for HIV, ask about PrEP medicine to prevent HIV.  Get tested for HIV at least once in your life, whether you are at risk for HIV or not.  Cancer screening tests  Cervical cancer screening: If you have a cervix, begin getting regular cervical cancer screening tests starting at age 21.  Breast cancer scan (mammogram): If you've ever had breasts, begin having regular mammograms starting at age 40. This is a scan to check for breast cancer.  Colon cancer screening: It is important to start screening for colon cancer at age 45.  Have a colonoscopy test every 10 years (or more often if you're at risk) Or, ask your provider about stool tests like a FIT test every year or Cologuard test every 3 years.  To learn more about your testing options, visit:   .  For help making a decision, visit:   https://bit.ly/oy25318.  Prostate cancer screening test: If you have a prostate, ask your care team if a prostate cancer screening test (PSA) at age 55 is right for you.  Lung cancer screening: If you are a current or former smoker ages 50 to 80, ask your care team if ongoing lung cancer screenings are right for you.  For informational purposes only. Not to replace the advice of your health care provider. Copyright   2023 [facility]. All rights reserved. Clinically reviewed by the [facility] Transitions Program. [employer] 360507 - REV 01/24.

## 2025-06-24 NOTE — PROGRESS NOTES
Preventive Care Visit  [facility]  Deisy Gita Jones PA-C, Family Medicine  Jun 24, 2025      Assessment & Plan     Routine general medical examination at a health care facility  - PRIMARY CARE FOLLOW-UP SCHEDULING  - REVIEW OF HEALTH MAINTENANCE PROTOCOL ORDERS  - PRIMARY CARE FOLLOW-UP SCHEDULING    Methamphetamine use disorder, severe, in sustained remission (H) -smoking no IVDU -sober since approximately April 2023.  Was in treatment most recently at [facility] for a total of 3 months.  Bipolar disorder, in full remission, most recent episode mixed (H24) - Dr. Reyes Camp- every 3 months  Applauded continued sobriety and encouraged continuation.  Continue on Zyprexa and follow-up with Dr. Camp as recommended.  - OLANZapine (ZYPREXA) 7.5 MG tablet    Hepatic steatosis - saw Dr. Fernández of hepatology who advised diet/exercise efforts 3/2024 and referred to PCP for monitoring  Overweight with body mass index (BMI) 25.0-29.9  Mixed hyperlipidemia  Applauded weight loss efforts and encouraged continuation.  Recommend restarting atorvastatin.  Will do nonfasting cholesterol panel today and repeat in 3 to 6 months to see if improvement noted with statin restart.  - atorvastatin (LIPITOR) 20 MG tablet  Dispense: 90 tablet; Refill: 3  - atorvastatin (LIPITOR) 20 MG tablet  Dispense: 90 tablet; Refill: 0  - Lipid panel reflex to direct LDL Non-fasting    Elevated blood pressure reading without diagnosis of hypertension  BP elevation, however, will continue to monitor.  Continue diet and exercise efforts as he has been successful to date and lowering weight.  Will continue to monitor.    Prediabetes  Managed with diet.  Labs for surveillance.  - Comprehensive metabolic panel (BMP + Alb, Alk Phos, ALT, AST, Total. Bili, TP)  - Hemoglobin A1c    Nicotine dependence due to vaping tobacco product  Tapering/cessation strongly encouraged.  Patient declined any assistance at this time.  Will  "work on decreasing/tapering.    Hypovitaminosis D  Has been off of vitamin D for the last few weeks.  Labs for surveillance.  - Vitamin D Deficiency    Immunity status testing  Booster given last year.  Labs to see if he has converted to immunity.  - Hepatitis B Surface Antibody    Screening for deficiency anemia  Routine screening  - CBC with platelets      Patient has been advised of split billing requirements and indicates understanding: Yes        Nicotine/Tobacco Cessation  He reports that he has been smoking vaping device. He started smoking about 22 years ago. He has never used smokeless tobacco.  Nicotine/Tobacco Cessation Plan  Information offered: Patient not interested at this time      BMI  Estimated body mass index is 28.04 kg/m  as calculated from the following:    Height as of this encounter: 1.676 m (5' 6\").    Weight as of this encounter: 78.8 kg (173 lb 11.2 oz).   Weight management plan: Discussed healthy diet and exercise guidelines  Reviewed preventive health counseling, as reflected in patient instructions  Counseling  Appropriate preventive services were addressed with this patient via screening, questionnaire, or discussion as appropriate for fall prevention, nutrition, physical activity, Tobacco-use cessation, social engagement, weight loss and cognition.  Checklist reviewing preventive services available has been given to the patient.  Reviewed patient's diet, addressing concerns and/or questions.   The patient was instructed to see the dentist every 6 months.     The longitudinal plan of care for the diagnosis(es)/condition(s) as documented were addressed during this visit. Due to the added complexity in care, I will continue to support Kevin in the subsequent management and with ongoing continuity of care.    Return in about 1 year (around 6/24/2026) for Physical Exam, Fasting labs, Medication recheck.       Deisy Jones MBA, MS, PA-C  [facility] "   Kevin is a 35 year old, presenting for the following:  Physical        6/24/2025     1:14 PM   Additional Questions   Roomed by Claudia ANN   Accompanied by Mom          HPI       Hyperlipidemia  The patient was inquired about the continuation of atorvastatin, a cholesterol medication that was prescribed last year. The patient confirmed initiating the medication but mentioned running out of it after the initial 90-day prescription and has not refilled it since. It was emphasized that atorvastatin is a maintenance medication and must be taken consistently to be effective. I advised the patient to set up auto-refills at the pharmacy or consider mail order, which offers free delivery, to ensure a regular supply. A follow-up was planned to reassess their cholesterol levels in three months after resuming atorvastatin.    Recent Labs   Lab Test 06/24/25  1407 06/20/24  0754   CHOL 214* 218*   HDL 51 47   * 137*   TRIG 84 172*       Skin concern  The patient and his mother are wondering about removal of skin lesions on anterior neck and wondering if referral to dermatology is needed.     Bipolar disorder, in full remission, most recent episode mixed (H24) - Dr. Reyes Camp  Methamphetamine use disorder, severe, in sustained remission (H) -smoking no IVDU -sober since approximately April 2023.  Was in treatment most recently at [facility] for a total of 3 months.  Patient follows with [facility] psychiatrist, Dr. Camp.  Sees him every 3 months.  Continues on Zyprexa (olanzapine) 7.5 mg once daily.  The patient acknowledged changes in appetite, possibly linked to Zyprexa, and noted taking the medication in the morning due to side effects which seems to work well for him.  Patient reports continued sobriety from methamphetamine, last use recorded in April 2024.  He does occasionally drink alcohol socially but not on a daily basis.    Hypovitaminosis D  The patient reports that he was taking vitamin D  supplementation for the last year but ran out a few weeks ago.     Fatty liver/Elevated LFTs/hematochezia/obesity  Kevin initially presented on 3/6/2024 with abdominal pain, decreased appetite, emesis, and dark stools. ER evaluation revealed hepatic steatosis (fatty liver changes) and elevated liver function tests (LFTs), leading to a referral to hepatology. In follow-up, Kevin denied symptoms such as fevers, chills, nausea, emesis, abdominal pain, and diarrhea but reported intermittent bright red blood per rectum for several years without unintentional weight loss or history of abdominal surgeries. GI evaluation on 3/12/2024 found no signs of decompensated liver disease, normal INR, normal platelets, and a FibroScan indicating no advanced fibrosis. His AST levels normalized, ALT was mildly elevated, and tests for hepatitis B, thyroid issues, and iron overload were negative (not immune to hepatitis B). Metabolic risk factors included obesity, prediabetes, and hyperlipidemia. Lifestyle recommendations focused on weight loss through dietary changes, portion control, avoiding carbohydrates, sugars, processed foods, and high fructose items, and incorporating aerobic and resistance training at least three times a week. Weight loss medications such as semaglutide or liraglutide were considered for those with BMI >27 with comorbid conditions or BMI >30. Imaging studies from the initial presentation on 3/6/2024, including an abdominal ultrasound and a CT abdomen pelvis with contrast, confirmed hepatic steatosis without acute processes, gallstones, or biliary obstruction. GI recommendations included normal/negative results from autoimmune, hepatitis B and C, and celiac testing, and no contraindications for prescribing a statin. Emphasis was placed on dietary and exercise modifications with a goal of 7-10% weight loss.     Kevin was encouraged to continue dietary changes and start regular exercise, aiming for a 15 to 20  pound weight loss, with a follow-up scheduled in three months. The conclusion was that Kevin's hepatic steatosis with elevated LFTs was likely related to metabolic risk factors, with a management plan focusing on lifestyle modifications to promote weight loss and improve metabolic health, and follow-up care transferred to his primary care provider.     There were no reports of heartburn or recent gastrointestinal bleeding/hematochezia; colonoscopy 7/3/2024 showed only hemorrhoids, and no further procedures are needed until age 43.    The patient reported notable weight loss since January, attributed to dietary changes such as reducing rice and meat consumption while increasing protein and salads. This weight loss is beneficial for their liver health and pre-diabetic status. The patient requested liver function testing, which was agreed upon to be conducted during a non-fasting lab appointment today.  Wt Readings from Last 5 Encounters:   06/24/25 78.8 kg (173 lb 11.2 oz)   07/03/24 84.8 kg (187 lb)   06/20/24 85.2 kg (187 lb 14.4 oz)   03/21/24 83.7 kg (184 lb 8 oz)   03/12/24 84.2 kg (185 lb 9.6 oz)      Personal history of tobacco use, presenting hazards to health  Vaping daily.  Tried nicotine patches but was still vaping when using the patches.  No decreased urge.  Has never tried gum or lozenges.  Would like clearance from psychiatry before considering any oral regimen such as Chantix or bupropion.  Patient using nicotine patches and tapering.  Encouraged continuation.      Wt Readings from Last 5 Encounters:   06/24/25 78.8 kg (173 lb 11.2 oz)   07/03/24 84.8 kg (187 lb)   06/20/24 85.2 kg (187 lb 14.4 oz)   03/21/24 83.7 kg (184 lb 8 oz)   03/12/24 84.2 kg (185 lb 9.6 oz)        Elevated LFTs  Prediabetes  Hepatic steatosis  Mixed hyperlipidemia  The patient was started on atorvastatin after June 2024 physical.  Only took for the first 90 days of this medication and has not aware that he needs to continue  this.  Are you regularly taking any medication or supplement to lower your cholesterol?   Yes- Atorvastatin 20 mg  Are you having muscle aches or other side effects that you think could be caused by your cholesterol lowering medication?  No    Advance Care Planning    Discussed advance care planning with patient; however, patient declined at this time.        6/19/2025   General Health   How would you rate your overall physical health? Good   Feel stress (tense, anxious, or unable to sleep) Not at all         6/19/2025   Nutrition   Three or more servings of calcium each day? Yes   Diet: I don't know   How many servings of fruit and vegetables per day? (!) 2-3   How many sweetened beverages each day? (!) 3         6/19/2025   Exercise   Days per week of moderate/strenous exercise 5 days   Average minutes spent exercising at this level 60 min         6/19/2025   Social Factors   Frequency of gathering with friends or relatives Twice a week   Worry food won't last until get money to buy more No   Food not last or not have enough money for food? No   Do you have housing? (Housing is defined as stable permanent housing and does not include staying outside in a car, in a tent, in an abandoned building, in an overnight shelter, or couch-surfing.) Yes   Are you worried about losing your housing? No   Lack of transportation? Yes -relies on mother   Unable to get utilities (heat,electricity)? No    (!) TRANSPORTATION CONCERN PRESENT      6/19/2025   Dental   Dentist two times every year? (!) NO         Today's PHQ-2 Score:       6/24/2025    12:56 PM   PHQ-2 ( 1999 [employer])   Q1: Little interest or pleasure in doing things 0    Q2: Feeling down, depressed or hopeless 0    PHQ-2 Score 0    Q1: Little interest or pleasure in doing things Not at all    Q2: Feeling down, depressed or hopeless Not at all    PHQ-2 Score 0        Proxy-reported           6/19/2025   Substance Use   Alcohol more than 3/day or more than 7/wk No   Do  "you use any other substances recreationally? No     Social History     Tobacco Use    Smoking status: Every Day     Types: Vaping Device     Start date: 2003    Smokeless tobacco: Never    Tobacco comments:     Smoked since age 13, 1/4 PPD until 2023 - then started vaping nicotine   Vaping Use    Vaping status: Every Day    Substances: Nicotine   Substance Use Topics    Alcohol use: Not Currently     Comment: sober since treatment 2023    Drug use: Not Currently     Comment: smoking meth since age 15 - sober since treatment 2023 6/19/2025   STI Screening   New sexual partner(s) since last STI/HIV test? No         6/19/2025   Contraception/Family Planning   Questions about contraception or family planning No   What are your periods like? Not currently having periods        Reviewed and updated as needed this visit by Provider       Med Hx              Past Medical History:   Diagnosis Date    Bipolar 2 disorder (H)     Depression     Hepatitis     ? in [facility]    Substance use disorder     Meth use     Past Surgical History:   Procedure Laterality Date    COLONOSCOPY N/A 07/03/2024    Procedure: Colonoscopy;  Surgeon: Gisela Cross MD;  Location:  GI         Review of Systems  Constitutional, HEENT, cardiovascular, pulmonary, GI, , musculoskeletal, neuro, skin, endocrine and psych systems are negative, except as otherwise noted.     Objective    Exam  /80   Pulse 81   Temp (!) 96.5  F (35.8  C) (Tympanic)   Resp 14   Ht 1.676 m (5' 6\")   Wt 78.8 kg (173 lb 11.2 oz)   SpO2 99%   BMI 28.04 kg/m     Estimated body mass index is 28.04 kg/m  as calculated from the following:    Height as of this encounter: 1.676 m (5' 6\").    Weight as of this encounter: 78.8 kg (173 lb 11.2 oz).    Physical Exam  GENERAL: alert and no distress  EYES: Eyes grossly normal to inspection, PERRL and conjunctivae and sclerae normal  HENT: ear canals and TM's normal, nose and mouth without ulcers or " lesions  NECK: no adenopathy, no asymmetry, masses, or scars  RESP: lungs clear to auscultation - no rales, rhonchi or wheezes  CV: regular rate and rhythm, normal S1 S2, no S3 or S4, no murmur, click or rub, no peripheral edema  ABDOMEN: soft, nontender, no hepatosplenomegaly, no masses and bowel sounds normal  MS: no gross musculoskeletal defects noted, no edema  SKIN: Multiple hyperpigmented pedunculated lesions consistent with skin tags on anterior aspect of neck with no nefarious features  NEURO: Normal strength and tone, mentation intact and speech normal  PSYCH: mentation appears normal, affect normal/bright        Signed Electronically by: Deisy Jones PA-C

## 2025-06-25 PROBLEM — F31.78 BIPOLAR DISORDER, IN FULL REMISSION, MOST RECENT EPISODE MIXED: Status: ACTIVE | Noted: 2025-06-25

## 2025-06-25 PROBLEM — E55.9 HYPOVITAMINOSIS D: Status: ACTIVE | Noted: 2025-06-25

## 2025-06-25 PROBLEM — E66.3 OVERWEIGHT WITH BODY MASS INDEX (BMI) 25.0-29.9: Status: ACTIVE | Noted: 2025-06-25

## 2025-06-25 LAB
HBV SURFACE AB SERPL IA-ACNC: 30.3 M[IU]/ML
HBV SURFACE AB SERPL IA-ACNC: REACTIVE M[IU]/ML